# Patient Record
Sex: FEMALE | Race: WHITE | Employment: OTHER | URBAN - METROPOLITAN AREA
[De-identification: names, ages, dates, MRNs, and addresses within clinical notes are randomized per-mention and may not be internally consistent; named-entity substitution may affect disease eponyms.]

---

## 2022-06-01 ENCOUNTER — TRANSCRIBE ORDER (OUTPATIENT)
Dept: REGISTRATION | Age: 72
End: 2022-06-01

## 2022-06-01 ENCOUNTER — HOSPITAL ENCOUNTER (OUTPATIENT)
Dept: PREADMISSION TESTING | Age: 72
Discharge: HOME | End: 2022-06-01
Payer: MEDICARE

## 2022-06-01 DIAGNOSIS — Z01.812 BLOOD TESTS PRIOR TO TREATMENT OR PROCEDURE: ICD-10-CM

## 2022-06-01 DIAGNOSIS — M17.11 OSTEOARTHRITIS OF RIGHT KNEE: Primary | ICD-10-CM

## 2022-06-01 DIAGNOSIS — M17.11 OSTEOARTHRITIS OF RIGHT KNEE: ICD-10-CM

## 2022-06-01 LAB
ALBUMIN SERPL-MCNC: 3.5 G/DL (ref 3.4–5)
ALBUMIN/GLOB SERPL: 1 {RATIO} (ref 0.8–1.7)
ALP SERPL-CCNC: 58 U/L (ref 45–117)
ALT SERPL-CCNC: 23 U/L (ref 13–56)
ANION GAP SERPL CALC-SCNC: 6 MMOL/L (ref 3–18)
APPEARANCE UR: CLEAR
APTT PPP: 29.1 SEC (ref 23–36.4)
AST SERPL-CCNC: 18 U/L (ref 10–38)
ATRIAL RATE: 87 BPM
BACTERIA URNS QL MICRO: ABNORMAL /HPF
BASOPHILS # BLD: 0.1 K/UL (ref 0–0.1)
BASOPHILS NFR BLD: 1 % (ref 0–2)
BILIRUB SERPL-MCNC: 0.3 MG/DL (ref 0.2–1)
BILIRUB UR QL: NEGATIVE
BUN SERPL-MCNC: 19 MG/DL (ref 7–18)
BUN/CREAT SERPL: 22 (ref 12–20)
CALCIUM SERPL-MCNC: 9.1 MG/DL (ref 8.5–10.1)
CALCULATED P AXIS, ECG09: 65 DEGREES
CALCULATED R AXIS, ECG10: 18 DEGREES
CALCULATED T AXIS, ECG11: 64 DEGREES
CAOX CRY URNS QL MICRO: ABNORMAL
CHLORIDE SERPL-SCNC: 108 MMOL/L (ref 100–111)
CO2 SERPL-SCNC: 30 MMOL/L (ref 21–32)
COLOR UR: YELLOW
CREAT SERPL-MCNC: 0.88 MG/DL (ref 0.6–1.3)
DIAGNOSIS, 93000: NORMAL
DIFFERENTIAL METHOD BLD: ABNORMAL
EOSINOPHIL # BLD: 0.3 K/UL (ref 0–0.4)
EOSINOPHIL NFR BLD: 3 % (ref 0–5)
EPITH CASTS URNS QL MICRO: ABNORMAL /LPF (ref 0–5)
ERYTHROCYTE [DISTWIDTH] IN BLOOD BY AUTOMATED COUNT: 14.5 % (ref 11.6–14.5)
ERYTHROCYTE [SEDIMENTATION RATE] IN BLOOD: 17 MM/HR (ref 0–30)
GLOBULIN SER CALC-MCNC: 3.5 G/DL (ref 2–4)
GLUCOSE SERPL-MCNC: 108 MG/DL (ref 74–99)
GLUCOSE UR STRIP.AUTO-MCNC: NEGATIVE MG/DL
HBA1C MFR BLD: 6.1 % (ref 4.2–5.6)
HCT VFR BLD AUTO: 35.6 % (ref 35–45)
HGB BLD-MCNC: 11.3 G/DL (ref 12–16)
HGB UR QL STRIP: NEGATIVE
IMM GRANULOCYTES # BLD AUTO: 0 K/UL (ref 0–0.04)
IMM GRANULOCYTES NFR BLD AUTO: 0 % (ref 0–0.5)
INR PPP: 0.9 (ref 0.8–1.2)
KETONES UR QL STRIP.AUTO: ABNORMAL MG/DL
LEUKOCYTE ESTERASE UR QL STRIP.AUTO: ABNORMAL
LYMPHOCYTES # BLD: 2.9 K/UL (ref 0.9–3.6)
LYMPHOCYTES NFR BLD: 33 % (ref 21–52)
MCH RBC QN AUTO: 28.5 PG (ref 24–34)
MCHC RBC AUTO-ENTMCNC: 31.7 G/DL (ref 31–37)
MCV RBC AUTO: 89.9 FL (ref 78–100)
MONOCYTES # BLD: 0.6 K/UL (ref 0.05–1.2)
MONOCYTES NFR BLD: 7 % (ref 3–10)
NEUTS SEG # BLD: 4.9 K/UL (ref 1.8–8)
NEUTS SEG NFR BLD: 56 % (ref 40–73)
NITRITE UR QL STRIP.AUTO: NEGATIVE
NRBC # BLD: 0 K/UL (ref 0–0.01)
NRBC BLD-RTO: 0 PER 100 WBC
P-R INTERVAL, ECG05: 146 MS
PH UR STRIP: 5.5 [PH] (ref 5–8)
PLATELET # BLD AUTO: 340 K/UL (ref 135–420)
PMV BLD AUTO: 10.4 FL (ref 9.2–11.8)
POTASSIUM SERPL-SCNC: 3.6 MMOL/L (ref 3.5–5.5)
PROT SERPL-MCNC: 7 G/DL (ref 6.4–8.2)
PROT UR STRIP-MCNC: NEGATIVE MG/DL
PROTHROMBIN TIME: 13 SEC (ref 11.5–15.2)
Q-T INTERVAL, ECG07: 384 MS
QRS DURATION, ECG06: 86 MS
QTC CALCULATION (BEZET), ECG08: 462 MS
RBC # BLD AUTO: 3.96 M/UL (ref 4.2–5.3)
RBC #/AREA URNS HPF: NEGATIVE /HPF (ref 0–5)
SODIUM SERPL-SCNC: 144 MMOL/L (ref 136–145)
SP GR UR REFRACTOMETRY: 1.02 (ref 1–1.03)
UROBILINOGEN UR QL STRIP.AUTO: 1 EU/DL (ref 0.2–1)
VENTRICULAR RATE, ECG03: 87 BPM
WBC # BLD AUTO: 8.8 K/UL (ref 4.6–13.2)
WBC URNS QL MICRO: ABNORMAL /HPF (ref 0–5)

## 2022-06-01 PROCEDURE — 36415 COLL VENOUS BLD VENIPUNCTURE: CPT

## 2022-06-01 PROCEDURE — 81001 URINALYSIS AUTO W/SCOPE: CPT

## 2022-06-01 PROCEDURE — 93005 ELECTROCARDIOGRAM TRACING: CPT

## 2022-06-01 PROCEDURE — 85610 PROTHROMBIN TIME: CPT

## 2022-06-01 PROCEDURE — 85025 COMPLETE CBC W/AUTO DIFF WBC: CPT

## 2022-06-01 PROCEDURE — 85652 RBC SED RATE AUTOMATED: CPT

## 2022-06-01 PROCEDURE — 83036 HEMOGLOBIN GLYCOSYLATED A1C: CPT

## 2022-06-01 PROCEDURE — 80053 COMPREHEN METABOLIC PANEL: CPT

## 2022-06-01 PROCEDURE — 85730 THROMBOPLASTIN TIME PARTIAL: CPT

## 2022-06-02 LAB
BACTERIA SPEC CULT: NORMAL
BACTERIA SPEC CULT: NORMAL
SERVICE CMNT-IMP: NORMAL

## 2022-06-06 ENCOUNTER — HOSPITAL ENCOUNTER (OUTPATIENT)
Dept: PREADMISSION TESTING | Age: 72
Discharge: HOME | End: 2022-06-06

## 2022-06-06 VITALS — BODY MASS INDEX: 35.44 KG/M2 | WEIGHT: 200 LBS | HEIGHT: 63 IN

## 2022-06-06 RX ORDER — HYDROCHLOROTHIAZIDE 12.5 MG/1
12.5 TABLET ORAL DAILY
COMMUNITY

## 2022-06-06 RX ORDER — CEFAZOLIN SODIUM/WATER 2 G/20 ML
2 SYRINGE (ML) INTRAVENOUS ONCE
Status: CANCELLED | OUTPATIENT
Start: 2022-06-27 | End: 2022-06-27

## 2022-06-06 RX ORDER — CHOLECALCIFEROL (VITAMIN D3) 50 MCG
CAPSULE ORAL DAILY
COMMUNITY
End: 2022-06-27

## 2022-06-06 RX ORDER — LISINOPRIL 10 MG/1
10 TABLET ORAL DAILY
COMMUNITY

## 2022-06-06 RX ORDER — SODIUM CHLORIDE, SODIUM LACTATE, POTASSIUM CHLORIDE, CALCIUM CHLORIDE 600; 310; 30; 20 MG/100ML; MG/100ML; MG/100ML; MG/100ML
125 INJECTION, SOLUTION INTRAVENOUS CONTINUOUS
Status: CANCELLED | OUTPATIENT
Start: 2022-06-27

## 2022-06-06 RX ORDER — EZETIMIBE 10 MG/1
10 TABLET ORAL DAILY
COMMUNITY

## 2022-06-06 RX ORDER — ESOMEPRAZOLE MAGNESIUM 20 MG/1
20 TABLET, DELAYED RELEASE ORAL DAILY
COMMUNITY

## 2022-06-06 NOTE — PERIOP NOTES
Patient advised to wear mask when entering any of the buildings. They will no longer need to be tested or quarantine prior to procedure. Patient does not meet criteria for special pop. No hx of sleep apnea or previous screening. Denies hx of MH. PCP is aware of surgery. CHG skin care kit given and process reviewed. Not participating in any research study or clinical trials. Patient instructed when coming in for surgery, do not use any lotions, creams or deodorant. Also to remove all jewelry, hairpins, make-up and nail polish. Instructed to wear something loose fitting and comfortable, easy to take off, easy to put on. Patient to bring copy of medical directive dos. Preoperative Nutrition Screen (MANJIT)   Patient's Age: 70 y.o. Patient's BMI: Estimated body mass index is 36 kg/m² as calculated from the following:    Height as of this encounter: 5' 2.5\" (1.588 m). Weight as of this encounter: 90.7 kg (200 lb). 1. Does the patient have a documented serum albumin less than 3.0 within the last 90 days? No = 0          2. Is patient's BMI less than 18.5 (or less than 20 if age over 72)? No = 0        3. Has the patient had an unplanned weight loss of 10% of body weight or more in the last 6 months? No = 0   4. Has the patient been eating less than 50% of their normal diet in the preceding week?  No = 0   MANJIT Score (number of Yes responses), 0-4 0       Electronically signed by Darrin Galeazzi, RN on 6/6/22 at 8:42 AM

## 2022-06-23 PROBLEM — M17.11 PRIMARY LOCALIZED OSTEOARTHRITIS OF RIGHT KNEE: Chronic | Status: ACTIVE | Noted: 2022-06-23

## 2022-06-23 RX ORDER — TRANEXAMIC ACID 650 1/1
1950 TABLET ORAL ONCE
Status: CANCELLED | OUTPATIENT
Start: 2022-06-23 | End: 2022-06-23

## 2022-06-23 RX ORDER — DEXAMETHASONE SODIUM PHOSPHATE 4 MG/ML
8 INJECTION, SOLUTION INTRA-ARTICULAR; INTRALESIONAL; INTRAMUSCULAR; INTRAVENOUS; SOFT TISSUE ONCE
Status: CANCELLED | OUTPATIENT
Start: 2022-06-23 | End: 2022-06-23

## 2022-06-23 RX ORDER — DOCUSATE SODIUM 100 MG/1
100 CAPSULE, LIQUID FILLED ORAL 2 TIMES DAILY
Status: CANCELLED | OUTPATIENT
Start: 2022-06-23

## 2022-06-23 RX ORDER — EZETIMIBE 10 MG/1
10 TABLET ORAL DAILY
Status: CANCELLED | OUTPATIENT
Start: 2022-06-23

## 2022-06-23 RX ORDER — CEFAZOLIN SODIUM/WATER 2 G/20 ML
2 SYRINGE (ML) INTRAVENOUS EVERY 8 HOURS
Status: CANCELLED | OUTPATIENT
Start: 2022-06-23 | End: 2022-06-24

## 2022-06-23 RX ORDER — ASPIRIN 325 MG
325 TABLET, DELAYED RELEASE (ENTERIC COATED) ORAL 2 TIMES DAILY
Status: CANCELLED | OUTPATIENT
Start: 2022-06-23

## 2022-06-23 RX ORDER — LANOLIN ALCOHOL/MO/W.PET/CERES
1 CREAM (GRAM) TOPICAL 3 TIMES DAILY
Status: CANCELLED | OUTPATIENT
Start: 2022-06-23

## 2022-06-23 NOTE — H&P
[id number] [facility]  History and Physical Exam    Patient: Tanya Fritz MRN: [id number]  SSN: [id number]    YOB: 1950  Age: 70 y.o. Sex: female      Subjective:      Chief Complaint: Right knee pain    History of Present Illness:  Patient complains of pain to the right knee and difficulty ambulating, which has progressively worsened over several months. X-rays showed osteoarthritis of the joint. The patient's pain has persisted and progressed despite conservative treatments and therapies. The patient has been previously treated with nsaids. The patient has at this time opted for surgical intervention. Past Medical History:   Diagnosis Date    Arthritis     GERD (gastroesophageal reflux disease)     High cholesterol     Hypertension     Nausea & vomiting     Primary localized osteoarthritis of right knee 6/23/2022     Past Surgical History:   Procedure Laterality Date    HX DILATION AND CURETTAGE  1970s    HX HEENT  1958    Nasal growth removed    HX HYSTERECTOMY  2016    HX SALPINGO-OOPHORECTOMY  2016     Social History     Occupational History    Not on file   Tobacco Use    Smoking status: Never Smoker    Smokeless tobacco: Never Used   Vaping Use    Vaping Use: Never used   Substance and Sexual Activity    Alcohol use: Yes     Comment: 1-2/year    Drug use: Never    Sexual activity: Not on file     Prior to Admission medications    Medication Sig Start Date End Date Taking? Authorizing Provider   lisinopriL (PRINIVIL, ZESTRIL) 10 mg tablet Take 10 mg by mouth daily. Provider, Historical   hydroCHLOROthiazide (HYDRODIURIL) 12.5 mg tablet Take 12.5 mg by mouth daily. Indications: high blood pressure    Provider, Historical   ezetimibe (ZETIA) 10 mg tablet Take 10 mg by mouth daily. Provider, Historical   TURMERIC PO Take  by mouth daily.     Provider, Historical   omega 3-dha-epa-fish oil (Fish OiL) 100-160-1,000 mg cap Take  by mouth daily.    Provider, Historical   esomeprazole magnesium (NexIUM 24HR) 20 mg TbEC Take  by mouth daily. Provider, Historical       Allergies: No Known Allergies     Review of Systems:  A comprehensive review of systems was negative except for that written in the History of Present Illness. Objective:       Physical Exam:  HEENT: Normocephalic, atraumatic  Lungs:  Clear to auscultation  Heart:   Regular rate and rhythm  Abdomen: Soft  Extremities:  Pain with range of motion of the right knee(s). Active extension decreased, active flexion decreased. Tenderness generalized. No deformity. No effusion. Positive crepitus. Antalgic gait. Assessment:      Arthritis of the right knee. Plan:       Proceed with scheduled RIGHT TOTAL KNEE ARTHROPLASTY. The various methods of treatment have been discussed with the patient and family. After consideration of risks, benefits, and other options for treatment, the patient has consented to surgical interventions. Questions were answered and preoperative teaching was done by Dr Jacqueline Camara.      Signed By: JF Solis     June 23, 2022

## 2022-06-27 ENCOUNTER — HOME HEALTH ADMISSION (OUTPATIENT)
Dept: HOME HEALTH SERVICES | Facility: HOME HEALTH | Age: 72
End: 2022-06-27
Payer: MEDICARE

## 2022-06-27 ENCOUNTER — ANESTHESIA (OUTPATIENT)
Dept: SURGERY | Age: 72
End: 2022-06-27
Payer: MEDICARE

## 2022-06-27 ENCOUNTER — ANESTHESIA EVENT (OUTPATIENT)
Dept: SURGERY | Age: 72
End: 2022-06-27
Payer: MEDICARE

## 2022-06-27 ENCOUNTER — APPOINTMENT (OUTPATIENT)
Dept: GENERAL RADIOLOGY | Age: 72
End: 2022-06-27
Attending: PHYSICIAN ASSISTANT
Payer: MEDICARE

## 2022-06-27 ENCOUNTER — HOSPITAL ENCOUNTER (OUTPATIENT)
Age: 72
Discharge: HOME HEALTH | End: 2022-06-27
Attending: ORTHOPAEDIC SURGERY | Admitting: ORTHOPAEDIC SURGERY
Payer: MEDICARE

## 2022-06-27 VITALS
BODY MASS INDEX: 35.73 KG/M2 | TEMPERATURE: 97 F | WEIGHT: 209.3 LBS | HEIGHT: 64 IN | OXYGEN SATURATION: 96 % | DIASTOLIC BLOOD PRESSURE: 73 MMHG | SYSTOLIC BLOOD PRESSURE: 156 MMHG | HEART RATE: 72 BPM | RESPIRATION RATE: 18 BRPM

## 2022-06-27 DIAGNOSIS — M17.11 PRIMARY LOCALIZED OSTEOARTHRITIS OF RIGHT KNEE: Primary | Chronic | ICD-10-CM

## 2022-06-27 LAB
ABO + RH BLD: NORMAL
BLOOD GROUP ANTIBODIES SERPL: NORMAL
GLUCOSE BLD STRIP.AUTO-MCNC: 100 MG/DL (ref 70–110)
SPECIMEN EXP DATE BLD: NORMAL

## 2022-06-27 PROCEDURE — 82962 GLUCOSE BLOOD TEST: CPT

## 2022-06-27 PROCEDURE — 77030039760: Performed by: ORTHOPAEDIC SURGERY

## 2022-06-27 PROCEDURE — 77030020782 HC GWN BAIR PAWS FLX 3M -B: Performed by: ORTHOPAEDIC SURGERY

## 2022-06-27 PROCEDURE — 76210000006 HC OR PH I REC 0.5 TO 1 HR: Performed by: ORTHOPAEDIC SURGERY

## 2022-06-27 PROCEDURE — 77030012893: Performed by: ORTHOPAEDIC SURGERY

## 2022-06-27 PROCEDURE — 76010000153 HC OR TIME 1.5 TO 2 HR: Performed by: ORTHOPAEDIC SURGERY

## 2022-06-27 PROCEDURE — 77030003666 HC NDL SPINAL BD -A: Performed by: ORTHOPAEDIC SURGERY

## 2022-06-27 PROCEDURE — 76060000034 HC ANESTHESIA 1.5 TO 2 HR: Performed by: ORTHOPAEDIC SURGERY

## 2022-06-27 PROCEDURE — 77030034694 HC SCPL CANADY PLSM DISP USMD -E: Performed by: ORTHOPAEDIC SURGERY

## 2022-06-27 PROCEDURE — 36415 COLL VENOUS BLD VENIPUNCTURE: CPT

## 2022-06-27 PROCEDURE — 77030027138 HC INCENT SPIROMETER -A: Performed by: ORTHOPAEDIC SURGERY

## 2022-06-27 PROCEDURE — 74011250636 HC RX REV CODE- 250/636: Performed by: STUDENT IN AN ORGANIZED HEALTH CARE EDUCATION/TRAINING PROGRAM

## 2022-06-27 PROCEDURE — 74011000250 HC RX REV CODE- 250: Performed by: PHYSICIAN ASSISTANT

## 2022-06-27 PROCEDURE — 74011000258 HC RX REV CODE- 258: Performed by: ORTHOPAEDIC SURGERY

## 2022-06-27 PROCEDURE — 77030038010: Performed by: ORTHOPAEDIC SURGERY

## 2022-06-27 PROCEDURE — 86900 BLOOD TYPING SEROLOGIC ABO: CPT

## 2022-06-27 PROCEDURE — 97116 GAIT TRAINING THERAPY: CPT

## 2022-06-27 PROCEDURE — 74011250637 HC RX REV CODE- 250/637: Performed by: PHYSICIAN ASSISTANT

## 2022-06-27 PROCEDURE — 77030031139 HC SUT VCRL2 J&J -A: Performed by: ORTHOPAEDIC SURGERY

## 2022-06-27 PROCEDURE — 76210000024 HC REC RM PH II 2.5 TO 3 HR: Performed by: ORTHOPAEDIC SURGERY

## 2022-06-27 PROCEDURE — 97165 OT EVAL LOW COMPLEX 30 MIN: CPT

## 2022-06-27 PROCEDURE — 77030013708 HC HNDPC SUC IRR PULS STRY –B: Performed by: ORTHOPAEDIC SURGERY

## 2022-06-27 PROCEDURE — 2709999900 HC NON-CHARGEABLE SUPPLY: Performed by: ORTHOPAEDIC SURGERY

## 2022-06-27 PROCEDURE — 77030037713 HC CLOSR DEV INCIS ZIP STRY -B: Performed by: ORTHOPAEDIC SURGERY

## 2022-06-27 PROCEDURE — 97161 PT EVAL LOW COMPLEX 20 MIN: CPT

## 2022-06-27 PROCEDURE — 74011000250 HC RX REV CODE- 250: Performed by: ORTHOPAEDIC SURGERY

## 2022-06-27 PROCEDURE — C1776 JOINT DEVICE (IMPLANTABLE): HCPCS | Performed by: ORTHOPAEDIC SURGERY

## 2022-06-27 PROCEDURE — 77030014144 HC TY SPN ANES BBMI -B: Performed by: STUDENT IN AN ORGANIZED HEALTH CARE EDUCATION/TRAINING PROGRAM

## 2022-06-27 PROCEDURE — 74011000250 HC RX REV CODE- 250: Performed by: STUDENT IN AN ORGANIZED HEALTH CARE EDUCATION/TRAINING PROGRAM

## 2022-06-27 PROCEDURE — 97535 SELF CARE MNGMENT TRAINING: CPT

## 2022-06-27 PROCEDURE — 77030011628: Performed by: ORTHOPAEDIC SURGERY

## 2022-06-27 PROCEDURE — 77030002934 HC SUT MCRYL J&J -B: Performed by: ORTHOPAEDIC SURGERY

## 2022-06-27 PROCEDURE — 77030033263 HC DRSG MEPILEX 16-48IN BORD MOLN -B: Performed by: ORTHOPAEDIC SURGERY

## 2022-06-27 PROCEDURE — 74011250636 HC RX REV CODE- 250/636: Performed by: PHYSICIAN ASSISTANT

## 2022-06-27 PROCEDURE — 73560 X-RAY EXAM OF KNEE 1 OR 2: CPT

## 2022-06-27 PROCEDURE — 74011250636 HC RX REV CODE- 250/636: Performed by: ORTHOPAEDIC SURGERY

## 2022-06-27 PROCEDURE — 76942 ECHO GUIDE FOR BIOPSY: CPT | Performed by: ORTHOPAEDIC SURGERY

## 2022-06-27 PROCEDURE — C1713 ANCHOR/SCREW BN/BN,TIS/BN: HCPCS | Performed by: ORTHOPAEDIC SURGERY

## 2022-06-27 PROCEDURE — 64447 NJX AA&/STRD FEMORAL NRV IMG: CPT | Performed by: ORTHOPAEDIC SURGERY

## 2022-06-27 DEVICE — KNEE K1 TOT HEMI STD CEM IMPL CAPPED K1 OD: Type: IMPLANTABLE DEVICE | Site: KNEE | Status: FUNCTIONAL

## 2022-06-27 DEVICE — RT SIZE 4 FEMORAL CR NONPOROUS
Type: IMPLANTABLE DEVICE | Site: KNEE | Status: FUNCTIONAL
Brand: BKS TRIMAX

## 2022-06-27 DEVICE — CEMENT BNE 40GM FULL DOSE PMMA W/O ANTIBIO HI VISC N RADPQ: Type: IMPLANTABLE DEVICE | Site: KNEE | Status: FUNCTIONAL

## 2022-06-27 DEVICE — SIZE 4 8MM TIBIAL INSERT CR
Type: IMPLANTABLE DEVICE | Site: KNEE | Status: FUNCTIONAL
Brand: BKS E-VITALIZE

## 2022-06-27 DEVICE — 32MM PATELLA, VITAMIN E
Type: IMPLANTABLE DEVICE | Site: KNEE | Status: FUNCTIONAL
Brand: BKS E-VITALIZE

## 2022-06-27 DEVICE — SIZE 4 TIBIAL TRAY NONPOROUS
Type: IMPLANTABLE DEVICE | Site: KNEE | Status: FUNCTIONAL
Brand: BALANCED KNEE SYSTEM

## 2022-06-27 RX ORDER — SODIUM CHLORIDE 9 MG/ML
300 INJECTION, SOLUTION INTRAVENOUS CONTINUOUS
Status: DISCONTINUED | OUTPATIENT
Start: 2022-06-27 | End: 2022-06-27 | Stop reason: HOSPADM

## 2022-06-27 RX ORDER — BISMUTH SUBSALICYLATE 262 MG
1 TABLET,CHEWABLE ORAL DAILY
COMMUNITY

## 2022-06-27 RX ORDER — FENTANYL CITRATE 50 UG/ML
INJECTION, SOLUTION INTRAMUSCULAR; INTRAVENOUS
Status: SHIPPED | OUTPATIENT
Start: 2022-06-27 | End: 2022-06-27

## 2022-06-27 RX ORDER — ACETAMINOPHEN 325 MG/1
650 TABLET ORAL EVERY 6 HOURS
Status: DISCONTINUED | OUTPATIENT
Start: 2022-06-27 | End: 2022-06-27 | Stop reason: HOSPADM

## 2022-06-27 RX ORDER — PROPOFOL 10 MG/ML
INJECTION, EMULSION INTRAVENOUS AS NEEDED
Status: DISCONTINUED | OUTPATIENT
Start: 2022-06-27 | End: 2022-06-27 | Stop reason: HOSPADM

## 2022-06-27 RX ORDER — CEFAZOLIN SODIUM/WATER 2 G/20 ML
2 SYRINGE (ML) INTRAVENOUS ONCE
Status: COMPLETED | OUTPATIENT
Start: 2022-06-27 | End: 2022-06-27

## 2022-06-27 RX ORDER — LIDOCAINE HYDROCHLORIDE 20 MG/ML
INJECTION, SOLUTION EPIDURAL; INFILTRATION; INTRACAUDAL; PERINEURAL AS NEEDED
Status: DISCONTINUED | OUTPATIENT
Start: 2022-06-27 | End: 2022-06-27 | Stop reason: HOSPADM

## 2022-06-27 RX ORDER — KETOROLAC TROMETHAMINE 15 MG/ML
INJECTION, SOLUTION INTRAMUSCULAR; INTRAVENOUS AS NEEDED
Status: DISCONTINUED | OUTPATIENT
Start: 2022-06-27 | End: 2022-06-27 | Stop reason: HOSPADM

## 2022-06-27 RX ORDER — FENTANYL CITRATE 50 UG/ML
50 INJECTION, SOLUTION INTRAMUSCULAR; INTRAVENOUS
Status: DISCONTINUED | OUTPATIENT
Start: 2022-06-27 | End: 2022-06-27 | Stop reason: HOSPADM

## 2022-06-27 RX ORDER — MIDAZOLAM HYDROCHLORIDE 1 MG/ML
INJECTION, SOLUTION INTRAMUSCULAR; INTRAVENOUS
Status: SHIPPED | OUTPATIENT
Start: 2022-06-27 | End: 2022-06-27

## 2022-06-27 RX ORDER — SODIUM CHLORIDE 0.9 % (FLUSH) 0.9 %
5-40 SYRINGE (ML) INJECTION EVERY 8 HOURS
Status: DISCONTINUED | OUTPATIENT
Start: 2022-06-27 | End: 2022-06-27 | Stop reason: HOSPADM

## 2022-06-27 RX ORDER — KETOROLAC TROMETHAMINE 30 MG/ML
15 INJECTION, SOLUTION INTRAMUSCULAR; INTRAVENOUS EVERY 6 HOURS
Status: DISCONTINUED | OUTPATIENT
Start: 2022-06-27 | End: 2022-06-27 | Stop reason: HOSPADM

## 2022-06-27 RX ORDER — SODIUM CHLORIDE 0.9 % (FLUSH) 0.9 %
5-40 SYRINGE (ML) INJECTION AS NEEDED
Status: DISCONTINUED | OUTPATIENT
Start: 2022-06-27 | End: 2022-06-27 | Stop reason: HOSPADM

## 2022-06-27 RX ORDER — OXYCODONE HYDROCHLORIDE 5 MG/1
5 TABLET ORAL
Status: DISCONTINUED | OUTPATIENT
Start: 2022-06-27 | End: 2022-06-27 | Stop reason: HOSPADM

## 2022-06-27 RX ORDER — LIDOCAINE HYDROCHLORIDE 10 MG/ML
INJECTION INFILTRATION; PERINEURAL AS NEEDED
Status: DISCONTINUED | OUTPATIENT
Start: 2022-06-27 | End: 2022-06-27 | Stop reason: HOSPADM

## 2022-06-27 RX ORDER — NALOXONE HYDROCHLORIDE 0.4 MG/ML
0.4 INJECTION, SOLUTION INTRAMUSCULAR; INTRAVENOUS; SUBCUTANEOUS AS NEEDED
Status: DISCONTINUED | OUTPATIENT
Start: 2022-06-27 | End: 2022-06-27 | Stop reason: HOSPADM

## 2022-06-27 RX ORDER — ONDANSETRON 2 MG/ML
4 INJECTION INTRAMUSCULAR; INTRAVENOUS ONCE
Status: COMPLETED | OUTPATIENT
Start: 2022-06-27 | End: 2022-06-27

## 2022-06-27 RX ORDER — SODIUM CHLORIDE, SODIUM LACTATE, POTASSIUM CHLORIDE, CALCIUM CHLORIDE 600; 310; 30; 20 MG/100ML; MG/100ML; MG/100ML; MG/100ML
125 INJECTION, SOLUTION INTRAVENOUS CONTINUOUS
Status: DISCONTINUED | OUTPATIENT
Start: 2022-06-27 | End: 2022-06-27 | Stop reason: HOSPADM

## 2022-06-27 RX ORDER — NALOXONE HYDROCHLORIDE 0.4 MG/ML
0.04 INJECTION, SOLUTION INTRAMUSCULAR; INTRAVENOUS; SUBCUTANEOUS
Status: DISCONTINUED | OUTPATIENT
Start: 2022-06-27 | End: 2022-06-27 | Stop reason: HOSPADM

## 2022-06-27 RX ORDER — MELOXICAM 7.5 MG/1
7.5 TABLET ORAL 2 TIMES DAILY
Qty: 28 TABLET | Refills: 0 | Status: SHIPPED | OUTPATIENT
Start: 2022-06-27 | End: 2022-07-11

## 2022-06-27 RX ORDER — PANTOPRAZOLE SODIUM 40 MG/1
40 TABLET, DELAYED RELEASE ORAL ONCE
Status: COMPLETED | OUTPATIENT
Start: 2022-06-27 | End: 2022-06-27

## 2022-06-27 RX ORDER — DEXAMETHASONE SODIUM PHOSPHATE 10 MG/ML
INJECTION INTRAMUSCULAR; INTRAVENOUS
Status: SHIPPED | OUTPATIENT
Start: 2022-06-27 | End: 2022-06-27

## 2022-06-27 RX ORDER — ONDANSETRON 2 MG/ML
4 INJECTION INTRAMUSCULAR; INTRAVENOUS
Status: DISCONTINUED | OUTPATIENT
Start: 2022-06-27 | End: 2022-06-27 | Stop reason: HOSPADM

## 2022-06-27 RX ORDER — ALBUTEROL SULFATE 0.83 MG/ML
2.5 SOLUTION RESPIRATORY (INHALATION)
Status: DISCONTINUED | OUTPATIENT
Start: 2022-06-27 | End: 2022-06-27 | Stop reason: HOSPADM

## 2022-06-27 RX ORDER — DIPHENHYDRAMINE HYDROCHLORIDE 50 MG/ML
12.5 INJECTION, SOLUTION INTRAMUSCULAR; INTRAVENOUS
Status: DISCONTINUED | OUTPATIENT
Start: 2022-06-27 | End: 2022-06-27 | Stop reason: HOSPADM

## 2022-06-27 RX ORDER — CEFADROXIL 500 MG/1
500 CAPSULE ORAL 2 TIMES DAILY
Qty: 10 CAPSULE | Refills: 0 | Status: SHIPPED | OUTPATIENT
Start: 2022-06-27 | End: 2022-07-02

## 2022-06-27 RX ORDER — SODIUM CHLORIDE 9 MG/ML
125 INJECTION, SOLUTION INTRAVENOUS CONTINUOUS
Status: DISCONTINUED | OUTPATIENT
Start: 2022-06-27 | End: 2022-06-27 | Stop reason: HOSPADM

## 2022-06-27 RX ORDER — ACETAMINOPHEN 500 MG
1000 TABLET ORAL ONCE
Status: COMPLETED | OUTPATIENT
Start: 2022-06-27 | End: 2022-06-27

## 2022-06-27 RX ORDER — ROPIVACAINE HYDROCHLORIDE 5 MG/ML
INJECTION, SOLUTION EPIDURAL; INFILTRATION; PERINEURAL
Status: SHIPPED | OUTPATIENT
Start: 2022-06-27 | End: 2022-06-27

## 2022-06-27 RX ORDER — PROPOFOL 10 MG/ML
INJECTION, EMULSION INTRAVENOUS
Status: DISCONTINUED | OUTPATIENT
Start: 2022-06-27 | End: 2022-06-27 | Stop reason: HOSPADM

## 2022-06-27 RX ORDER — DEXMEDETOMIDINE HYDROCHLORIDE 100 UG/ML
INJECTION, SOLUTION INTRAVENOUS
Status: SHIPPED | OUTPATIENT
Start: 2022-06-27 | End: 2022-06-27

## 2022-06-27 RX ORDER — TRANEXAMIC ACID 10 MG/ML
1 INJECTION, SOLUTION INTRAVENOUS ONCE
Status: COMPLETED | OUTPATIENT
Start: 2022-06-27 | End: 2022-06-27

## 2022-06-27 RX ORDER — OXYCODONE HYDROCHLORIDE 5 MG/1
10 TABLET ORAL
Status: DISCONTINUED | OUTPATIENT
Start: 2022-06-27 | End: 2022-06-27 | Stop reason: HOSPADM

## 2022-06-27 RX ORDER — HYDROMORPHONE HYDROCHLORIDE 1 MG/ML
0.5 INJECTION, SOLUTION INTRAMUSCULAR; INTRAVENOUS; SUBCUTANEOUS AS NEEDED
Status: DISCONTINUED | OUTPATIENT
Start: 2022-06-27 | End: 2022-06-27 | Stop reason: HOSPADM

## 2022-06-27 RX ORDER — METOCLOPRAMIDE HYDROCHLORIDE 5 MG/ML
10 INJECTION INTRAMUSCULAR; INTRAVENOUS
Status: DISCONTINUED | OUTPATIENT
Start: 2022-06-27 | End: 2022-06-27 | Stop reason: HOSPADM

## 2022-06-27 RX ORDER — SODIUM CHLORIDE, SODIUM LACTATE, POTASSIUM CHLORIDE, CALCIUM CHLORIDE 600; 310; 30; 20 MG/100ML; MG/100ML; MG/100ML; MG/100ML
50 INJECTION, SOLUTION INTRAVENOUS CONTINUOUS
Status: DISCONTINUED | OUTPATIENT
Start: 2022-06-27 | End: 2022-06-27 | Stop reason: HOSPADM

## 2022-06-27 RX ORDER — ASPIRIN 325 MG
325 TABLET ORAL 2 TIMES DAILY
Qty: 42 TABLET | Refills: 0 | Status: SHIPPED | OUTPATIENT
Start: 2022-06-27 | End: 2022-07-18

## 2022-06-27 RX ORDER — OXYCODONE HYDROCHLORIDE 5 MG/1
5 TABLET ORAL
Qty: 60 TABLET | Refills: 0 | Status: SHIPPED | OUTPATIENT
Start: 2022-06-27 | End: 2022-07-04

## 2022-06-27 RX ORDER — ONDANSETRON 2 MG/ML
INJECTION INTRAMUSCULAR; INTRAVENOUS AS NEEDED
Status: DISCONTINUED | OUTPATIENT
Start: 2022-06-27 | End: 2022-06-27 | Stop reason: HOSPADM

## 2022-06-27 RX ORDER — NALBUPHINE HYDROCHLORIDE 10 MG/ML
5 INJECTION, SOLUTION INTRAMUSCULAR; INTRAVENOUS; SUBCUTANEOUS
Status: DISCONTINUED | OUTPATIENT
Start: 2022-06-27 | End: 2022-06-27 | Stop reason: HOSPADM

## 2022-06-27 RX ADMIN — SODIUM CHLORIDE, SODIUM LACTATE, POTASSIUM CHLORIDE, AND CALCIUM CHLORIDE: 600; 310; 30; 20 INJECTION, SOLUTION INTRAVENOUS at 09:36

## 2022-06-27 RX ADMIN — FENTANYL CITRATE 100 MCG: 50 INJECTION, SOLUTION INTRAMUSCULAR; INTRAVENOUS at 08:51

## 2022-06-27 RX ADMIN — DEXAMETHASONE SODIUM PHOSPHATE 4 MG: 10 INJECTION, SOLUTION INTRAMUSCULAR; INTRAVENOUS at 08:51

## 2022-06-27 RX ADMIN — ROPIVACAINE HYDROCHLORIDE 20 ML: 5 INJECTION, SOLUTION EPIDURAL; INFILTRATION; PERINEURAL at 08:51

## 2022-06-27 RX ADMIN — ONDANSETRON 4 MG: 2 INJECTION INTRAMUSCULAR; INTRAVENOUS at 11:32

## 2022-06-27 RX ADMIN — SODIUM CHLORIDE, SODIUM LACTATE, POTASSIUM CHLORIDE, AND CALCIUM CHLORIDE 1000 ML: 600; 310; 30; 20 INJECTION, SOLUTION INTRAVENOUS at 08:10

## 2022-06-27 RX ADMIN — PROPOFOL 20 MG: 10 INJECTION, EMULSION INTRAVENOUS at 09:41

## 2022-06-27 RX ADMIN — SODIUM CHLORIDE, SODIUM LACTATE, POTASSIUM CHLORIDE, AND CALCIUM CHLORIDE 125 ML/HR: 600; 310; 30; 20 INJECTION, SOLUTION INTRAVENOUS at 13:48

## 2022-06-27 RX ADMIN — Medication 2 G: at 09:39

## 2022-06-27 RX ADMIN — TRANEXAMIC ACID 1 G: 10 INJECTION, SOLUTION INTRAVENOUS at 09:37

## 2022-06-27 RX ADMIN — PROPOFOL 30 MCG/KG/MIN: 10 INJECTION, EMULSION INTRAVENOUS at 09:41

## 2022-06-27 RX ADMIN — ACETAMINOPHEN 1000 MG: 500 TABLET ORAL at 08:12

## 2022-06-27 RX ADMIN — LIDOCAINE HYDROCHLORIDE 2.5 ML: 10 INJECTION, SOLUTION INFILTRATION; PERINEURAL at 09:31

## 2022-06-27 RX ADMIN — MIDAZOLAM 2 MG: 1 INJECTION INTRAMUSCULAR; INTRAVENOUS at 08:51

## 2022-06-27 RX ADMIN — LIDOCAINE HYDROCHLORIDE 60 MG: 20 INJECTION, SOLUTION INTRAVENOUS at 09:41

## 2022-06-27 RX ADMIN — KETOROLAC TROMETHAMINE 15 MG: 15 INJECTION, SOLUTION INTRAMUSCULAR; INTRAVENOUS at 10:29

## 2022-06-27 RX ADMIN — PANTOPRAZOLE SODIUM 40 MG: 40 TABLET, DELAYED RELEASE ORAL at 08:12

## 2022-06-27 RX ADMIN — DEXMEDETOMIDINE HYDROCHLORIDE 25 MCG: 100 INJECTION, SOLUTION INTRAVENOUS at 08:51

## 2022-06-27 RX ADMIN — SODIUM CHLORIDE, SODIUM LACTATE, POTASSIUM CHLORIDE, AND CALCIUM CHLORIDE: 600; 310; 30; 20 INJECTION, SOLUTION INTRAVENOUS at 10:41

## 2022-06-27 RX ADMIN — SODIUM CHLORIDE, SODIUM LACTATE, POTASSIUM CHLORIDE, AND CALCIUM CHLORIDE 125 ML/HR: 600; 310; 30; 20 INJECTION, SOLUTION INTRAVENOUS at 08:10

## 2022-06-27 RX ADMIN — ONDANSETRON HYDROCHLORIDE 4 MG: 2 INJECTION INTRAMUSCULAR; INTRAVENOUS at 10:29

## 2022-06-27 RX ADMIN — METOCLOPRAMIDE HYDROCHLORIDE 10 MG: 5 INJECTION INTRAMUSCULAR; INTRAVENOUS at 13:36

## 2022-06-27 RX ADMIN — MEPIVACAINE HYDROCHLORIDE 45 MG: 15 INJECTION, SOLUTION EPIDURAL; INFILTRATION at 09:34

## 2022-06-27 NOTE — ANESTHESIA PROCEDURE NOTES
Peripheral Block    Start time: 6/27/2022 8:47 AM  End time: 6/27/2022 8:51 AM  Performed by: Cony Self MD  Authorized by: Cony Self MD       Pre-procedure: Indications: at surgeon's request and post-op pain management    Preanesthetic Checklist: patient identified, risks and benefits discussed, site marked, timeout performed, anesthesia consent given and patient being monitored    Timeout Time: 08:47 EDT          Block Type:   Block Type: Adductor canal block  Laterality:  Right  Monitoring:  Standard ASA monitoring, responsive to questions, oxygen, continuous pulse ox, frequent vital sign checks and heart rate  Injection Technique:  Single shot  Procedures: ultrasound guided    Patient Position: supine  Prep: chlorhexidine    Location:  Mid thigh  Needle Type:  Stimuplex  Needle Gauge:  20 G  Needle Localization:  Ultrasound guidance  Medication Injected:  FentaNYL citrate (PF) injection sedation initial, 100 mcg  midazolam (VERSED) injection, 2 mg  ropivacaine (PF) (NAROPIN) 5 mg/mL (0.5 %) injection, 20 mL  dexamethasone (PF) (DECADRON) 10 mg/mL injection, 4 mg  dexmedeTOMidine (PRECEDEX) 100 mcg/mL iv solution, 25 mcg  Med Admin Time: 6/27/2022 8:51 AM    Assessment:  Number of attempts:  1  Injection Assessment:  Incremental injection every 5 mL, negative aspiration for CSF, no paresthesia, ultrasound image on chart, local visualized surrounding nerve on ultrasound, negative aspiration for blood and no intravascular symptoms  Patient tolerance:  Patient tolerated the procedure well with no immediate complications  Ultrasound guidance was used to view and verify medication disbursement and was also used for needle placement.

## 2022-06-27 NOTE — DISCHARGE SUMMARY
[address] [facility] [id number]     DISCHARGE SUMMARY     PATIENT: Emery Koyanagi     MRN: [id number]   ADMIT DATE: 2022   [facility]   DISCHARGE DATE: 2022     ATTENDING: Kaleb Devi MD   DICTATING: JF Courtney         ADMISSION DIAGNOSIS: Primary localized osteoarthritis of right knee [M17.11]    DISCHARGE DIAGNOSIS: Status post RIGHT TOTAL KNEE ARTHROPLASTY    HISTORY OF PRESENT ILLNESS: The patient is a 70y.o. year-old female   with ongoing right knee pain secondary to osteoarthritis of her right knee. The patient's pain has persisted and progressed despite conservative treatments and therapies. The patient has at this time opted for surgical intervention. PAST MEDICAL HISTORY:   Past Medical History:   Diagnosis Date    Arthritis     GERD (gastroesophageal reflux disease)     High cholesterol     Hypertension     Nausea & vomiting     Primary localized osteoarthritis of right knee 2022       PAST SURGICAL HISTORY:   Past Surgical History:   Procedure Laterality Date    HX DILATION AND CURETTAGE      HX HEENT      Nasal growth removed    HX HYSTERECTOMY      HX SALPINGO-OOPHORECTOMY         ALLERGIES: No Known Allergies     CURRENT MEDICATIONS:  A list of medications prior to the time of admission include:  Prior to Admission medications    Medication Sig Start Date End Date Taking? Authorizing Provider   aspirin (ASPIRIN) 325 mg tablet Take 1 Tablet by mouth two (2) times a day for 21 days. 22 Yes Elliot Patterson PA   cefadroxil (DURICEF) 500 mg capsule Take 1 Capsule by mouth two (2) times a day for 5 days. 22 Yes Elliot Patterson PA   meloxicam (MOBIC) 7.5 mg tablet Take 1 Tablet by mouth two (2) times a day for 14 days.  22 Yes Elliot Patterson PA   oxyCODONE IR (ROXICODONE) 5 mg immediate release tablet Take 1 Tablet by mouth every four (4) hours as needed for Pain for up to 7 days. Max Daily Amount: 30 mg. 6/27/22 7/4/22 Yes Elliot Patterson PA   multivitamin (ONE A DAY) tablet Take 1 Tablet by mouth daily. Yes Provider, Historical   lisinopriL (PRINIVIL, ZESTRIL) 10 mg tablet Take 10 mg by mouth daily. Yes Provider, Historical   hydroCHLOROthiazide (HYDRODIURIL) 12.5 mg tablet Take 12.5 mg by mouth daily. Indications: high blood pressure   Yes Provider, Historical   ezetimibe (ZETIA) 10 mg tablet Take 10 mg by mouth daily. Yes Provider, Historical   esomeprazole magnesium (NexIUM 24HR) 20 mg TbEC Take  by mouth daily. Yes Provider, Historical       FAMILY HISTORY: History reviewed. No pertinent family history. SOCIAL HISTORY:   Social History     Socioeconomic History    Marital status:    Tobacco Use    Smoking status: Never Smoker    Smokeless tobacco: Never Used   Vaping Use    Vaping Use: Never used   Substance and Sexual Activity    Alcohol use: Yes     Comment: 1-2/year    Drug use: Never       REVIEW OF SYSTEMS: All review of systems are negative. PHYSICAL EXAMINATION: For a detailed physical exam, please refer to the patient's chart. HOSPITAL COURSE: The patient was taken to surgery the day of admission. she underwent a right total knee replacement. Operative course was benign. Estimated blood loss was approximately 150 cc. The patient was taken to the PACU in stable condition and was later taken to the floor in stable condition. During her hospital stay, the patient progressed well with physical therapy and occupational therapy, adherent to instructions. she had been cleared by physical therapy with stair training. she was placed on Aspirin for DVT prophylaxis. her pain has been well controlled with oral pain medications. her vitals have remained stable. she has also remained hemodynamically stable. The patient has been recommended for discharge home.      DISCHARGE INSTRUCTIONS: The patient is to be discharged home. she is to continue on her prior medications per the medication reconciliation form, to which we will add:   1. Aspirin 325 mg; 1 tablet po bid x 21 days  2. Mobic 7.5 mg; 1 tablet po bid x 14 days  3. Roxicodone 5 mg; 1 tablets p.o. every 4 hours p.r.n. for pain  4. Duricef 500 mg; 1 tablet p.o. every 12 hours x 5 days    The patient is to continue at home with home physical therapy 3 times a week to work on gait training, range of motion, strengthening, and weightbearing exercises as tolerated on her right lower extremity. The patient is to progress from a walker to a cane to complete total weightbearing as tolerable. The patient is to continue to keep her incision dry. The patient is to followup with Dr. Norma aBck, Onel Duron PA-C and/or Kaya Lepe PA-C in the office approximately 10-14 days status post for x-rays and further evaluation.     Berenice Dior 1723, [place]  8/09/17259:03 PM

## 2022-06-27 NOTE — PROGRESS NOTES
Problem: Mobility Impaired (Adult and Pediatric)  Goal: *Acute Goals and Plan of Care (Insert Text)  Description: PT goals to be met in 1 day:  Pt will be able to perform supine<>sit SBA for transfers at home. Pt will be able to perform sit<>stand SBA for increased ability to transfer at home safely. Pt will be able to participate in gt training >100' w/ RW, WBAT, GB and CGA/SBA for improved ability in home upon d/c. Pt will be educated regarding HEP per MD protocol for optimal AROM/strength outcomes. Note: [x]  Patient has met MD mobilization critieria for d/c home   [x]  Recommend HH with 24 hour adult care   []  Benefit from additional acute PT session to address:      PHYSICAL THERAPY EVALUATION    Patient: Saul Kraft (69 y.o. female)  Date: 6/27/2022  Primary Diagnosis: Primary localized osteoarthritis of right knee [M17.11]  Procedure(s) (LRB):  RIGHT TOTAL KNEE REPLACEMENT (Right) Day of Surgery   Precautions:   Fall,WBAT    PLOF: Independent    ASSESSMENT :  Based on the objective data described below, the patient presents with decreased mobility in regards to bed mobility, transfers, gt quality and tolerance, balance, stair negotiation and safety due to R TKA surgery. Decreased AROM of R knee, dec strength of R knee, pain in R knee, dec sensation of R knee also impacting pt functional mobility. Pt rating pain on numerical pain scale pre/post and during session 5/10. Pt ed regarding mobility safety, WB, HEP, ice application/use, elevation, environmental safety and home safe techniques. Pt sitting in recliner upon arrival w/ c/o nausea. Pt able to perform sit<>stand w/ CGA/SBA. Safety vc required throughout session to reinforce safety. Pt able to participate in gt training using RW, GB, WBAT and CGA w/ antalgic gt pattern. Pt requests stair training despite having stair lift for home. Pt was able to participate in stair training using step to pattern, B rails and CGA.   Answered questions by pt in regards to PT and mobility. Pt left sitting in recliner w/ all needs within reach and ice pack to R knee. Nurse Lilibeth Law aware of session and outcomes. Recommend HHPT with responsible adult care at least 24 hours upon hospital d/c. Patient will benefit from skilled intervention to address the above impairments. Patient's rehabilitation potential is considered to be Good  Factors which may influence rehabilitation potential include:   []         None noted  []         Mental ability/status  []         Medical condition  []         Home/family situation and support systems  []         Safety awareness  [x]         Pain tolerance/management  []         Other:      PLAN :  Recommendations and Planned Interventions:   [x]           Bed Mobility Training             []    Neuromuscular Re-Education  [x]           Transfer Training                   []    Orthotic/Prosthetic Training  [x]           Gait Training                          [x]    Modalities  [x]           Therapeutic Exercises           [x]    Edema Management/Control  [x]           Therapeutic Activities            [x]    Family Training/Education  [x]           Patient Education  []           Other (comment):    Frequency/Duration: Patient will be followed by physical therapy 1-2 times per day/4-7 days per week to address goals. Discharge Recommendations: Home Health  Further Equipment Recommendations for Discharge: N/A     SUBJECTIVE:   Patient stated My thigh feels so stiff and sore.     OBJECTIVE DATA SUMMARY:     Past Medical History:   Diagnosis Date    Arthritis     GERD (gastroesophageal reflux disease)     High cholesterol     Hypertension     Nausea & vomiting     Primary localized osteoarthritis of right knee 6/23/2022     Past Surgical History:   Procedure Laterality Date    HX DILATION AND CURETTAGE  1970s    HX HEENT  1958    Nasal growth removed    HX HYSTERECTOMY  2016    HX SALPINGO-OOPHORECTOMY  2016     Barriers to Learning/Limitations: yes;  anesthesia  Compensate with: Visual Cues, Verbal Cues, and Tactile Cues  Home Situation:  Home Situation  Home Environment: Private residence ([facility])  # Steps to Enter: 5  Rails to Enter: Yes  Hand Rails : Left  One/Two Story Residence: One story  Living Alone: No  Support Systems: Spouse/Significant Other  Patient Expects to be Discharged to[de-identified] Home with home health  Current DME Used/Available at Home: Ronnie Newness, rolling,Raised toilet seat,Cane, straight  Tub or Shower Type: Shower (w/ bench)  Critical Behavior:  Neurologic State: Alert; Appropriate for age  Orientation Level: Oriented to person;Oriented to place;Oriented to situation  Cognition: Follows commands  Safety/Judgement: Awareness of environment  Psychosocial  Patient Behaviors: Calm; Cooperative  Skin Condition/Temp: Dry;Warm  Skin Integrity: Incision (comment) (R knee)  Skin Integumentary  Skin Color: Appropriate for ethnicity  Skin Condition/Temp: Dry;Warm  Skin Integrity: Incision (comment) (R knee)  Strength:    Strength: Generally decreased, functional  Tone & Sensation:   Tone: Normal  Sensation: Impaired (R knee)  Range Of Motion:  AROM: Generally decreased, functional  Posture:  Functional Mobility:  Bed Mobility:  Scooting: Stand-by assistance  Transfers:  Sit to Stand: Contact guard assistance (vc)  Stand to Sit: Contact guard assistance;Stand-by assistance (vc)  Balance:   Sitting: Intact  Standing: Intact; With support  Ambulation/Gait Training:  Distance (ft): 130 Feet (ft)  Assistive Device: Walker, rolling;Gait belt  Ambulation - Level of Assistance: Contact guard assistance (vc)  Gait Abnormalities: Antalgic;Decreased step clearance; Step to gait  Right Side Weight Bearing: As tolerated  Base of Support: Shift to left  Stance: Right decreased  Speed/Latha: Slow  Step Length: Left shortened;Right shortened  Swing Pattern: Left asymmetrical;Right asymmetrical  Interventions: Safety awareness training; Tactile cues;Verbal cues; Visual/Demos  Stairs:  Number of Stairs Trained: 5  Stairs - Level of Assistance: Contact guard assistance (vc)  Rail Use: Both     Therapeutic Exercises:   Encouraged HEP  Pain:  Pain level pre-treatment: 5/10   Pain level post-treatment: 5/10   Pain Intervention(s) : Medication (see MAR); Rest, Ice, Repositioning  Response to intervention: Nurse notified, See doc flow    Activity Tolerance:   Fair   Please refer to the flowsheet for vital signs taken during this treatment. After treatment:   [x]         Patient left in no apparent distress sitting up in chair  []         Patient left in no apparent distress in bed  [x]         Call bell left within reach  [x]         Nursing notified  [x]         Caregiver present  []         Bed alarm activated  []         SCDs applied    COMMUNICATION/EDUCATION:   [x]         Role of Physical Therapy in the acute care setting. [x]         Fall prevention education was provided and the patient/caregiver indicated understanding. [x]         Patient/family have participated as able in goal setting and plan of care. [x]         Patient/family agree to work toward stated goals and plan of care. []         Patient understands intent and goals of therapy, but is neutral about his/her participation. []         Patient is unable to participate in goal setting/plan of care: ongoing with therapy staff.  []         Other:     Thank you for this referral.  Rajesh Kellogg, PT   Time Calculation: 26 mins      Eval Complexity: History: HIGH Complexity :3+ comorbidities / personal factors will impact the outcome/ POC Exam:MEDIUM Complexity : 3 Standardized tests and measures addressing body structure, function, activity limitation and / or participation in recreation  Presentation: LOW Complexity : Stable, uncomplicated  Clinical Decision Making:Low Complexity    Overall Complexity:LOW

## 2022-06-27 NOTE — INTERVAL H&P NOTE
Update History & Physical    The Patient's History and Physical of June 23, 2022 was reviewed with the patient and I examined the patient. There was no change. The surgical site was confirmed by the patient and me. Plan:  The risk, benefits, expected outcome, and alternative to the recommended procedure have been discussed with the patient. Patient understands and wants to proceed with the procedure.     Electronically signed by Damon Varner MD on 6/27/2022 at 8:01 AM

## 2022-06-27 NOTE — PROGRESS NOTES
Problem: Self Care Deficits Care Plan (Adult)  Goal: *Acute Goals and Plan of Care (Insert Text)  Description: Initial Occupational Therapy Goals (6/27/2022) Within 7 day(s):    1. Patient will perform grooming standing sinkside with supervision for increased independence with ADLs. 2. Patient will perform LB dressing with supervision & A/E PRN for increased independence with ADLs. 3. Patient will perform toilet transfer with supervision for increased independence with ADLs. 4. Patient will perform all aspects of toileting with supervision for increased independence with ADLs. 5. Patient will independently apply energy conservation techniques with 1 verbal cue(s)for increased independence with ADLs. 6. Patient will perform bathroom mobility with supervision for increased independence/safety with ADLs. Outcome: Progressing Towards Goal  OCCUPATIONAL THERAPY EVALUATION    Patient: Tomasa Narvaez (22 y.o. female)  Date: 6/27/2022  Primary Diagnosis: Primary localized osteoarthritis of right knee [M17.11]  Procedure(s) (LRB):  RIGHT TOTAL KNEE REPLACEMENT (Right) Day of Surgery   Precautions: Fall,WBAT  PLOF: pt mod I for ADLs/functional mobility    ASSESSMENT AND RECOMMENDATIONS:  Based on the objective data described below, the patient presents with RLE decreased ROM and strength affecting LE ADLs. Pt found seated in recliner chair, vitals assessed and WNL, pt reporting pain 5/10, agreeable to therapy. Educated pt on proper body mechanics for ADLs s/p TKR. Pt completed upper body dressing with supervision seated in chair. Pt able to thread B feet through underwear/shorts without assist, and CGA when standing to pull up to waist. Bending forward method utilized to perform sock/shoe donning, min A to complete task. Instructed on compensatory strategies for donning/doffing compression stockings, along with fall prevention strategies (wear proper fitting footwear with non-slip bottoms).  Pt CGA/SBA for STS/bathroom mobility/toilet transfer with vc for safe use of RW. Pt voided and performed bladder hygiene with SBA. Pt ambulated back to recliner, ice applied to R knee. Provided opportunity for pt to voice questions on ADL performance when home, pt has no further concerns. Patient will benefit from skilled Occupational Therapy intervention to maximize safety/independence with ADLs at d/c.    Education: Reviewed home safety, body mechanics, importance of moving every hour to prevent joint stiffness, role of ice for edema/pain control, Rolling Walker management/safety, and adaptive dressing techniques with patient verbalizing  understanding at this time     Patient will benefit from skilled intervention to address the above impairments. Patient's rehabilitation potential is considered to be Good  Factors which may influence rehabilitation potential include:   [x]             None noted  []             Mental ability/status  []             Medical condition  []             Home/family situation and support systems  []             Safety awareness  []             Pain tolerance/management  []             Other:        PLAN :  Recommendations and Planned Interventions:   [x]               Self Care Training                  [x]      Therapeutic Activities  [x]               Functional Mobility Training   []      Cognitive Retraining  []               Therapeutic Exercises           []      Endurance Activities  []               Balance Training                    []      Neuromuscular Re-Education  []               Visual/Perceptual Training     [x]      Home Safety Training  [x]               Patient Education                   [x]      Family Training/Education  []               Other (comment):    Frequency/Duration: Patient will be followed by Occupational Therapy 1-2 times per day/4-7 days per week to address goals.   Discharge Recommendations: Home health with adult supervision at least 24 hours after d/c  Further Equipment Recommendations for Discharge: N/A     SUBJECTIVE:   Patient stated I was really nauseous but I'm feeling better.     OBJECTIVE DATA SUMMARY:     Past Medical History:   Diagnosis Date    Arthritis     GERD (gastroesophageal reflux disease)     High cholesterol     Hypertension     Nausea & vomiting     Primary localized osteoarthritis of right knee 6/23/2022     Past Surgical History:   Procedure Laterality Date    HX DILATION AND CURETTAGE  1970s    HX HEENT  1958    Nasal growth removed    HX HYSTERECTOMY  2016    HX SALPINGO-OOPHORECTOMY  2016     Barriers to Learning/Limitations: yes;  physical, post-anesthesia  Compensate with: visual, verbal, tactile, kinesthetic cues/model    Home Situation/Prior Level of Function:   Home Situation  Home Environment: Private residence  # Steps to Enter: 5  Rails to Enter: Yes  Hand Rails : Left  One/Two Story Residence: One story  Living Alone: No  Support Systems: Spouse/Significant Other  Patient Expects to be Discharged to[de-identified] Home with home health  Current DME Used/Available at Home: Jeannette Mena, rolling,Raised toilet seat,Cane, straight  Tub or Shower Type: Shower (bench)  []  Right hand dominant   []  Left hand dominant    Cognitive/Behavioral Status:  Neurologic State: Alert; Appropriate for age  Orientation Level: Oriented to person;Oriented to place;Oriented to situation  Cognition: Follows commands  Safety/Judgement: Awareness of environment    Skin: R knee incision w/ Mepilex   Edema: compression hose in place & applied ice     Coordination: BUE  Coordination: Within functional limits  Fine Motor Skills-Upper: Left Intact; Right Intact    Gross Motor Skills-Upper: Left Intact; Right Intact    Balance:  Sitting: Intact  Standing: Intact; With support    Strength: BUE  Strength: Generally decreased, functional    Tone & Sensation:BUE  Tone: Normal  Sensation: Impaired (R knee)    Range of Motion: BUE  AROM: Generally decreased, functional    Functional Mobility and Transfers for ADLs:  Bed Mobility:  Scooting: Stand-by assistance  Transfers:  Sit to Stand: Contact guard assistance (vc)   Toilet Transfer : Contact guard assistance;Stand-by assistance   Bathroom Mobility: Contact guard assistance;Stand-by assistance    ADL Assessment:  Feeding: Independent  Oral Facial Hygiene/Grooming: Stand-by assistance  Bathing: Minimum assistance  Upper Body Dressing: Supervision  Lower Body Dressing: Contact guard assistance  Toileting: Stand by assistance    ADL Intervention:  Grooming  Grooming Assistance: Contact guard assistance  Position Performed: Standing  Washing Hands: Contact guard assistance    Upper Body Dressing Assistance  Dressing Assistance: Supervision  Pullover Shirt: Supervision    Lower Body Dressing Assistance  Dressing Assistance: Contact guard assistance  Underpants: Contact guard assistance  Socks: Minimum assistance  Slip on Shoes with Back: Minimum assistance  Leg Crossed Method Used: No  Position Performed: Seated in chair  Cues: Verbal cues provided;Visual cues provided    Toileting  Toileting Assistance: Contact guard assistance  Bladder Hygiene: Stand-by assistance  Clothing Management: Contact guard assistance    Cognitive Retraining  Safety/Judgement: Awareness of environment    Pain:  Pain level pre-treatment: 5/10  Pain level post-treatment: 5/10  Pain Intervention(s): Rest, Ice, Repositioning   Response to intervention: Nurse notified, see doc flow     Activity Tolerance:   Fair. Patient able to stand ~5 minute(s). Patient able to complete ADLs with intermittent rest breaks. Patient limited by pain, strength, ROM. Patient unsteady. Please refer to the flowsheet for vital signs taken during this treatment.   After treatment:   [x]  Patient left in no apparent distress sitting up in chair  []  Patient sitting on EOB  []  Patient left in no apparent distress in bed  [x]  Call bell left within reach  [x]  Nursing notified  [x]  Caregiver present  [x]  Ice applied  []  SCD's on while back in bed  [] Bed alarm activated    COMMUNICATION/EDUCATION:   Communication/Collaboration:  [x]       Role of Occupational Therapy in the acute care setting. [x]      Home safety education was provided and the patient/caregiver indicated understanding. [x]      Patient/family have participated as able in goal setting and plan of care. [x]      Patient/family agree to work toward stated goals and plan of care. []      Patient understands intent and goals of therapy, but is neutral about his/her participation. []      Patient is unable to participate in plan of care at this time. Thank you for this referral.  Paulo Crum, OTR/L  Time Calculation: 24 mins    Eval Complexity: History: MEDIUM Complexity : Expanded review of history including physical, cognitive and psychosocial  history ; Examination: LOW Complexity : 1-3 performance deficits relating to physical, cognitive , or psychosocial skils that result in activity limitations and / or participation restrictions ;    Decision Making:LOW Complexity : No comorbidities that affect functional and no verbal or physical assistance needed to complete eval tasks

## 2022-06-27 NOTE — OP NOTES
[address] [facility] [address] [facility]  Total Knee Arthroplasty    Patient: Lauren Saunders MRN: [id number]  SSN: [id number]    YOB: 1950  Age: 70 y.o. Sex: female      Date of Surgery: 6/27/2022   Preoperative Diagnosis: RIGHT KNEE OSTEOARTHRITIS   Postoperative Diagnosis: RIGHT KNEE OSTEOARTHRITIS   [facility]: [facility]  Surgeon: Jj Chapman MD  [facility]    Anesthesia: Spinal and adductor canal Nerve Block    Procedure: Total Knee Arthroplasty:   The complexity of the total joint surgery requires the use of a first assistant for positioning, retraction and assistance in closure. Tourniquet Time: Tourniquet not used. Estimated Blood Loss: Less than 100cc     Implants:   Implant Name Type Inv. Item Serial No.  Lot No. LRB No. Used Action   CEMENT BNE 40GM FULL DOSE PMMA W/O ANTIBIO HI VISC N RADPQ - [id number]  CEMENT BNE 40GM FULL DOSE PMMA W/O ANTIBIO HI VISC N RADPQ  [employer]WD [id number] Right 2 Implanted   INSERT TIB SZ 4 THK8MM CRUCE RET BKS E VITALIZE - [id number]  INSERT TIB SZ 4 THK8MM CRUCE RET BKS E VITALIZE  [employer]WD [id number] Right 1 Implanted   COMPONENT PAT H32MM STD E VITALIZE LEONELA FELICIA RND BAL SYS - [id number]  COMPONENT PAT H32MM STD E VITALIZE LEONELA FELICIA RND BAL SYS  [employer]WD [id number] Right 1 Implanted   TRAY TIB SZ 4 NP A BAL KNEE - [id number]  TRAY TIB SZ 4 NP A BAL KNEE  [employer]WD [id number] Right 1 Implanted   COMPONENT FEM SZ 4 R NP CRUCE RET BKS TRIMAX - [id number]  COMPONENT FEM SZ 4 R NP CRUCE RET BKS TRIMAX  [employer]WD [id number] Right 1 Implanted        Specimens: None    Additional Findings: None     Complications: none    Body Mass Index: Body mass index is 35.93 kg/m².     Procedure Detail:  Prior to the surgery the patient was administered a femoral nerve block in the preoperative holding area by the anesthesiologist. [place] was brought to the operating room and positioned on the operating table. She was anesthetized with anesthesia. Intravenous antibiotics were administered. Prior to the incision being made a timeout was called identifying the patient, procedure, operative side, and surgeon. A pneumatic tourniquet was placed about the limb and the right leg was prepped and draped in the usual sterile manner. The tourniquet was not inflated throughout the case. A midline anterior incision made over the knee. The incision was carried down through the subcutaneous tissue to the underlying capsule. A medial parapatellar capsular incision was performed. The medial capsular flap was carefully elevated around to the posterior medial corner protecting the medial collateral ligaments and the fibers. The patella was sized with a caliper, and approximately 10-12 mm was resected with an oscillating saw allowing the patella to be slid into the lateral gutter. It was not everted throughout the case. Our attention was first turned to the distal femur and using intermedullary instrumentation, a 5-degree valgus cut was on the distal end of the femur. The distal end of the femur was sized to a size 4 femoral component. Pins were inserted through the sizer and the corresponding 4-in-1 block was slid into place and pinned for stability. Anterior and posterior and chamfer cuts were made to accommodate the femoral component. The medial and lateral menisci were excised as were the anterior cruciate ligaments. Our attention was then turned to the tibia. Using extramedullary instrumentation, a 3-degree cut was made on the proximal end of the tibia. A spacer block was placed to show gaps had been balanced and a size 4  tibial base plate was placed on the tibia and pinned into place. Intramedullary reaming guide was placed on the tibia and the appropriate reamer was used followed by the keel punch to complete the preparation of the tibia.  A trial femoral component was then impacted on to the distal end of the femur. Trial reduction was then performed with incremental size trial bearing surfaces. The orthodevelopment BKS trimax CR 8mm bearing surface was inserted and allowed for full extension, good medial, lateral stability at 90 degrees of flexion, especially medially. Our attention was then turned to the patella. The patella was sized to a 32mm patella. The guide was pinned, placed over patella, 3 holes were drilled. Trial patella button was inserted and the patella was reduced in the knee. The patella tracked normally using no-touch technique. The trial components were then all removed. The real components were opened on the back. The cut surfaces of the bone were prepared using the PulsaVac lavage. Prior to this, the Aquamantys was used to cauterize any soft tissue bleeding. 40 grams of [employer] TV[employer] HV cement was mixed. The femoral and tibial components  and patellar component was cemented into place. Excess cement was removed from around the edge of bone using plastic curette. Once the cement had hardened, the knee was placed through range of motion and noted to be stable as mentioned above with the trail components. The wound was dry, therefore no drain was used. The operative knee was injected with 20 cc of exparel. The knee was then soaked with a diluted betadine solution for approximately 3 min. This was then thoroughly irrigated. The capsular layer was closed using a #2 stratafix suture with the knee flexed 90 degrees, while subcutaneous layers were closed using 2-0 Vicryl suture. Finally the skin was closed using Prineo, which were applied in occlusive fashion and sterile bandage applied. All sponge and needle counts were correct. She was taken to the recovery room extubated in stable condition.     Signed By: Jennifer Mark MD     June 27, 2022           Operative Note    Patient: Karley Mcgee  YOB: 1950  MRN: [id number]    Date of Procedure: 6/27/2022     Pre-Op Diagnosis: RIGHT KNEE OSTEOARTHRITIS    Post-Op Diagnosis: Same as preoperative diagnosis. Procedure(s):  RIGHT TOTAL KNEE REPLACEMENT    Surgeon(s):  Jermain Shaikh MD    Surgical Assistant: Physician Assistant: JF Maldonado    Anesthesia: Spinal     Estimated Blood Loss (mL):  less than 080     Complications: None    Specimens: * No specimens in log *     Implants:   Implant Name Type Inv.  Item Serial No.  Lot No. LRB No. Used Action   CEMENT BNE 40GM FULL DOSE PMMA W/O ANTIBIO HI VISC N RADPQ - [id number]  CEMENT BNE 40GM FULL DOSE PMMA W/O ANTIBIO HI VISC N RADPQ  [employer]_ [id number] Right 2 Implanted   INSERT TIB SZ 4 THK8MM CRUCE RET BKS E VITALIZE - [id number]  INSERT TIB SZ 4 THK8MM CRUCE RET BKS E VITALIZE  [employer]WD [id number] Right 1 Implanted   COMPONENT PAT H32MM STD E VITALIZE LEONELA FELICIA RND BAL SYS - [id number]  COMPONENT PAT H32MM STD E VITALIZE LEONELA FELICIA RND BAL SYS  [employer]WD [id number] Right 1 Implanted   TRAY TIB SZ 4 NP A BAL KNEE - [id number]  TRAY TIB SZ 4 NP A BAL KNEE  [employer]WD [id number] Right 1 Implanted   COMPONENT FEM SZ 4 R NP CRUCE RET BKS TRIMAX - [id number]  COMPONENT FEM SZ 4 R NP CRUCE RET BKS TRIMAX  [employer]WD [id number] Right 1 Implanted       Drains: * No LDAs found *    Findings: djd knee    Detailed Description of Procedure:   See above note    Electronically Signed by Jacque Pickering MD on 6/27/2022 at 10:26 AM

## 2022-06-27 NOTE — ANESTHESIA PROCEDURE NOTES
Spinal Block    Start time: 6/27/2022 9:33 AM  End time: 6/27/2022 9:34 AM  Performed by: Wellington Kilgore CRNA  Authorized by: Patriciaann Peabody, MD     Pre-procedure: Indications: primary anesthetic  Preanesthetic Checklist: patient identified, risks and benefits discussed, anesthesia consent, site marked, patient being monitored and timeout performed    Timeout Time: 09:32 EDT          Spinal Block:   Patient Position:  Seated  Prep Region:  Lumbar  Prep: chlorhexidine and patient draped      Location:  L4-5  Technique:  Single shot    Local Dose (mL):  3    Needle:   Needle type: sprotte.   Needle Gauge:  24 G  Attempts:  1      Events: CSF confirmed, no blood with aspiration and no paresthesia        Assessment:  Insertion:  Uncomplicated  Patient tolerance:  Patient tolerated the procedure well with no immediate complications

## 2022-06-27 NOTE — PERIOP NOTES
Patient can wiggle toes only at this time due to spinal block. Remaining in the bed until her full sensation has returned. Had one bout of N/V. Zofran 4mg was given in PACU. Stated she felt \"much better\" afterwards. Vitals stable. Tolerating fluids and crackers.  at her side, and call bell within reach.

## 2022-06-27 NOTE — PERIOP NOTES
Reviewed PTA medication list with patient/caregiver and patient/caregiver denies any additional medications. Patient admits to having a responsible adult care for them at home for at least 24 hours after surgery. Patient encouraged to use gown warming system and informed that using said warming gown to regulate body temperature prior to a procedure has been shown to help reduce the risks of blood clots and infection. Patient's pharmacy of choice verified and documented in PTA medication section. Dual skin assessment & fall risk band verification completed with June Michael RN.

## 2022-06-27 NOTE — PERIOP NOTES
TRANSFER - IN REPORT:    Verbal report received from ORN & CRNA on Araseli Herrera  being received from OR (unit) for routine post - op      Report consisted of patients Situation, Background, Assessment and   Recommendations(SBAR). Information from the following report(s) SBAR was reviewed with the receiving nurse. Opportunity for questions and clarification was provided. Assessment completed upon patients arrival to unit and care assumed.

## 2022-06-27 NOTE — ANESTHESIA POSTPROCEDURE EVALUATION
Post-Anesthesia Evaluation and Assessment    Cardiovascular Function/Vital Signs  Visit Vitals  /81   Pulse 70   Temp 36.2 °C (97.1 °F)   Resp 18   Ht 5' 4\" (1.626 m)   Wt 94.9 kg (209 lb 4.8 oz)   SpO2 95%   BMI 35.93 kg/m²       Patient is status post Procedure(s):  RIGHT TOTAL KNEE REPLACEMENT. Nausea/Vomiting: Controlled. Postoperative hydration reviewed and adequate. Pain:  Pain Scale 1: Numeric (0 - 10) (06/27/22 1233)  Pain Intensity 1: 5 (06/27/22 1233)   Managed. Neurological Status:   Neuro (WDL): Within Defined Limits (06/27/22 1215)   At baseline. Mental Status and Level of Consciousness: Baseline and appropriate for discharge. Pulmonary Status:   O2 Device: None (Room air) (06/27/22 1247)   Adequate oxygenation and airway patent. Complications related to anesthesia: None    Post-anesthesia assessment completed. No concerns. Patient has met all discharge requirements.     Signed By: Willis Sidhu MD    June 27, 2022

## 2022-06-27 NOTE — ANESTHESIA PREPROCEDURE EVALUATION
Relevant Problems   No relevant active problems       Anesthetic History     PONV          Review of Systems / Medical History  Patient summary reviewed, nursing notes reviewed and pertinent labs reviewed    Pulmonary  Within defined limits            Pertinent negatives: No COPD, asthma and sleep apnea     Neuro/Psych   Within defined limits        Pertinent negatives: No seizures and CVA   Cardiovascular    Hypertension            Pertinent negatives: No past MI and CHF       GI/Hepatic/Renal     GERD        Pertinent negatives: No liver disease and renal disease   Endo/Other        Arthritis  Pertinent negatives: No diabetes   Other Findings              Physical Exam    Airway  Mallampati: II  TM Distance: 4 - 6 cm  Neck ROM: normal range of motion   Mouth opening: Normal     Cardiovascular               Dental    Dentition: Poor dentition, Lower partial plate and Upper partial plate     Pulmonary                 Abdominal  GI exam deferred       Other Findings            Anesthetic Plan    ASA: 2  Anesthesia type: spinal      Post-op pain plan if not by surgeon: peripheral nerve block single    Induction: Intravenous  Anesthetic plan and risks discussed with: Patient

## 2022-06-27 NOTE — DISCHARGE INSTRUCTIONS
DISCHARGE SUMMARY from Nurse    PATIENT INSTRUCTIONS:    After general anesthesia or intravenous sedation, for 24 hours or while taking prescription Narcotics:  · Limit your activities  · Do not drive and operate hazardous machinery  · Do not make important personal or business decisions  · Do  not drink alcoholic beverages  · If you have not urinated within 8 hours after discharge, please contact your surgeon on call. Report the following to your surgeon:  · Excessive pain, swelling, redness or odor of or around the surgical area  · Temperature over 100.5  · Nausea and vomiting lasting longer than 4 hours or if unable to take medications  · Any signs of decreased circulation or nerve impairment to extremity: change in color, persistent  numbness, tingling, coldness or increase pain  · Any questions    What to do at Home:  Recommended activity: Activity as tolerated, and no driving until cleared by Dr. Tiffany Lepe give a list of your current medications to your Primary Care Provider. *  Please update this list whenever your medications are discontinued, doses are      changed, or new medications (including over-the-counter products) are added. *  Please carry medication information at all times in case of emergency situations. These are general instructions for a healthy lifestyle:    No smoking/ No tobacco products/ Avoid exposure to second hand smoke  Surgeon General's Warning:  Quitting smoking now greatly reduces serious risk to your health.     Obesity, smoking, and sedentary lifestyle greatly increases your risk for illness    A healthy diet, regular physical exercise & weight monitoring are important for maintaining a healthy lifestyle    You may be retaining fluid if you have a history of heart failure or if you experience any of the following symptoms:  Weight gain of 3 pounds or more overnight or 5 pounds in a week, increased swelling in our hands or feet or shortness of breath while lying flat in bed. Please call your doctor as soon as you notice any of these symptoms; do not wait until your next office visit. The discharge information has been reviewed with the patient and caregiver. The patient and caregiver verbalized understanding. Discharge medications reviewed with the patient and caregiver and appropriate educational materials and side effects teaching were provided. Patient armband removed and shredded  ___________________________________________________________________________________________________________________________________  [id number] [facility]   Patient Discharge Instructions    Emery Koyanagi / [id number] : 1950    Admitted (Not on file) Discharged: 2022     IF YOU HAVE ANY PROBLEMS ONCE YOU ARE AT [address] [place] [address] Drive:   Main office number: [phone]    Your follow up appointment to see either Dr. Laurent Serrano PA-C, or [facility] as scheduled in 2 weeks. If you are unsure of your appointment date call the office at [phone]. Medication Instructions     · Resume your home medictions as directed, you may have directed not to resume supplements until after your follow up. · A prescription for pain medication has been given   · It is important that you take the medication exactly as they are prescribed. · Keep your medication in the bottles provided by the pharmacist and keep a list of the medication names, dosages, and times to be taken in your wallet. · Do not take other medications without consulting your doctor. What to do at [address] [place] [address] Ave your prehospital diet. If you have excessive nausea or vomitting call your doctor's office. Be sure to maintain adequate fluid intake. Some pain medications may cause constipation. Remember to drink fluids, stay as active as possible, and eat plenty of fiber-rich foods.     Begin In-Home Physical Therapy; 3 times a week to work on gait training, range of motion, strengthening, and weight bearing exercises as tolerable. Continue to use your walker or cane when walking. May progress from the walker to a cane to complete total bearing as tolerable. Patient may shower. Wrap incision with plastic wrap/covering to prevent incision from getting wet. Avoid complete immersion. YOUR DRESSING SHOULD BE CHANGED BY YOUR HOME HEALTH NURSE 5-7 AFTER SURGERY ACCORDING TO THE DATE WRITTEN ON YOUR DRESSING. When to Call    - Call if you have a temperature greater then 101  - Unable to keep food down  - Are unable to bear any wieght   - Need a pain medication refill     Information obtained by :  I understand that if any problems occur once I am at home I am to contact my physician. I understand and acknowledge receipt of the instructions indicated above.                                                                                                                                            Physician's or R.N.'s Signature                                                                  Date/Time                                                                                                                                              Patient or Representative Signature                                                          Date/Time

## 2022-06-28 ENCOUNTER — HOME CARE VISIT (OUTPATIENT)
Dept: SCHEDULING | Facility: HOME HEALTH | Age: 72
End: 2022-06-28
Payer: MEDICARE

## 2022-06-28 VITALS
TEMPERATURE: 98.6 F | SYSTOLIC BLOOD PRESSURE: 132 MMHG | HEART RATE: 66 BPM | RESPIRATION RATE: 16 BRPM | OXYGEN SATURATION: 95 % | DIASTOLIC BLOOD PRESSURE: 65 MMHG

## 2022-06-28 PROCEDURE — 3331090001 HH PPS REVENUE CREDIT

## 2022-06-28 PROCEDURE — 400013 HH SOC

## 2022-06-28 PROCEDURE — 400018 HH-NO PAY CLAIM PROCEDURE

## 2022-06-28 PROCEDURE — A6213 FOAM DRG >16<=48 SQ IN W/BDR: HCPCS

## 2022-06-28 PROCEDURE — G0151 HHCP-SERV OF PT,EA 15 MIN: HCPCS

## 2022-06-28 PROCEDURE — 3331090002 HH PPS REVENUE DEBIT

## 2022-06-28 NOTE — Clinical Note
Got it  ----- Message -----  From: Luis Alfredo Isaac, PT  Sent: 6/28/2022   8:34 PM EDT  To: Abby Christianson  [birth year]  [id number]  Dr. Monica Olmstead TKR  returns July 11    Dressing changes July 5 and July 8    3 week 2    I am off July 8th . Alexa Arreaga are you able to do this discharge and dressing change?   Thank you

## 2022-06-28 NOTE — Clinical Note
got it thanks   ----- Message -----  From: Dee Holly, PT  Sent: 6/28/2022   8:34 PM EDT  To: Kiran Ford  [birth year]  [id number]  Dr. Denilson Banerjee TKR  returns July 11    Dressing changes July 5 and July 8    3 week 2    I am off July 8th . Sunshine Bernal are you able to do this discharge and dressing change?   Thank you

## 2022-06-28 NOTE — Clinical Note
Jannette Costa  [birth year]  [id number]  Dr. Fly Rios TKR  returns July 11    Dressing changes July 5 and July 8    3 week 2    I am off July 8th . Clair Lopez Henrine Chidi are you able to do this discharge and dressing change?   Thank you

## 2022-06-29 ENCOUNTER — HOME CARE VISIT (OUTPATIENT)
Dept: SCHEDULING | Facility: HOME HEALTH | Age: 72
End: 2022-06-29
Payer: MEDICARE

## 2022-06-29 ENCOUNTER — HOME CARE VISIT (OUTPATIENT)
Dept: HOME HEALTH SERVICES | Facility: HOME HEALTH | Age: 72
End: 2022-06-29
Payer: MEDICARE

## 2022-06-29 VITALS
TEMPERATURE: 98.2 F | OXYGEN SATURATION: 98 % | HEART RATE: 84 BPM | RESPIRATION RATE: 16 BRPM | SYSTOLIC BLOOD PRESSURE: 142 MMHG | DIASTOLIC BLOOD PRESSURE: 80 MMHG

## 2022-06-29 PROCEDURE — 3331090002 HH PPS REVENUE DEBIT

## 2022-06-29 PROCEDURE — 3331090001 HH PPS REVENUE CREDIT

## 2022-06-29 PROCEDURE — G0157 HHC PT ASSISTANT EA 15: HCPCS

## 2022-06-29 NOTE — HOME HEALTH
SUBJECTIVE: \"The girl came with the bike today and I did the bike for a while. I ended up falling asleep afterwards    WOUND:R knee covered in the AQUACEL AG bandage  . NO DRAINAGE NOTED. NO S/S OF INFECTION NOTED. DRESSING TO BE CHANGED JULY 5, AND JULY 8, 2022    ROM AT INITIAL EVAL: R knee flexion in supine heel slide 88 degrees  R knee flexion in sitting 95 degrees  R knee extension in supine quad set -13   R knee extensor lag with LAQ -23 degrees     OBJECTIVE: see interventions for further details    GAIT: Pt amb with FWW support over level houshold surfaces with step through gait pattern, full foot clearnace, WNL MICHAEL, slightly decreased leena and step length, good heelstrike and toe off noted, S level. THER EX: Supine ther ex AROM with verbal and visual cueing for quality of repititon. THER EX to include DFPF, HEELSLIDES, QUAD SETS, HS PRESSES, GLUTE SQUEEZES 2X10. Pt able to complete all ther ex with good tolerance to activity and no increased pain. SA02 on RA 95%, HR 82    TRANSFERS: Sit to stand from bed and chair using BUE's to push off, BLE's flexed but slight weight shift to L side to unweight R. Pt demonstrates G initial standing balance and reaches for walker once up. PATIENT REPONSE TO TREATMENT: Pt is pleasant and motivated for treatment    PT LEVEL OF UNDERSTANDING OF EDUCATION PROVIDED; Pt appears cognitive and able to understand all teaching provided    ASSESSMENT OF PROGRESS TOWARDS GOALS: Pt is making excellent progress with PT services by performance of supine ther ex and overall compliance with progrram thus far. Pt is compliant with icing and medication protocal. Pt was able to perform supine ther ex with good tolerance to activity today, and with good overall execution. Pt was instructed in elevating RLE over the back of the couch keeping it straight, and icing at the same time. Pt will most likely be progressed to a cane next visit.       CONTINUED NEED FOR FOR FOLLOWING SKILLS: Pt to be seen by PT services 3x2  to address  dx and clinical findings: decreased ROM, decreased strength, impaired gait, decreased ability w stair negotiation, increased swelling, decreased transfer status, decreased endurance, decreased balance and decreased safety, increased pain in order to improve functional mobility/quality of life, decrease burden of care, reduce risk for re-hospitalization, work towards patient's personal goals of return to PLOF w decrease risk for falls      PLAN FOR NEXT VISIT: GAIT TRAINING WITH CANE SUPPORT, GONIOMETRIC MEASUREMENT

## 2022-06-29 NOTE — HOME HEALTH
S: patient reports that she is doing well - having some increased pan compared to yesterday - she reports that she is icing consistently   O: PAIN: patient is taking pain medication on schedule, educated patient on use of ice for pain and edema  patient to apply ice to the right knee for pain and swelling control per MD protocol using a barrier to protect the skin. Patient to monitor the skin for any adverse effects such as color changes, irritation, mottled appearance. Patient to use ice for 20 minutes an hour for the first 2-3 days and then can reduce to 20 minutes 4-5 times a day. WOUND:R knee covered in the mepilex bandage  - wound not visible. surrouding the bandage there is moderate swelling without redness. per MD orders did not remove the bandage but educated patient on signs of infection and to NOT remove the bandage   dressing changes 7/5 and 7/8  ROM: R knee flexion in supine heel slide 88 degrees  R knee flexion in sitting 95 degrees  R knee extension in supine quad set -13   R knee extensor lag with LAQ -23 degrees   STRENGTH: R knee ext 3-/5, R knee flexion 3-/5, R hip flex 4-/5, R hip abd/adduction 4-/5  L knee flexion and extension 5/5, L hip flexion/abduction and addection 5/5  BED MOBILITY: patient demonstrated independence with bed mobility. EQUIPMENT: FWW, [facility]  TRANSFERS: patient completed sit to stand from the bed, chair and the toilet with exaggerated MICHAEL, maintenance of the R LE extended out in front of her, full weight shift to the left LE to unweight the R LE. Requires use of walker for initial stance for balance, safety and to unweight the R LE.    GAIT: ambulating with FWW with reduced weight bearing through the R LE, step to pattern with use of hip hiking for R LE swing through, minimal knee flexion in R swing, and reduced terminal knee extension on the R for initial contact/terminal swing. with visual cues for increasing continuity she was able to demonstrate a more fluid gait pattern with step through pattern but with continued asymmetrical step length right greater than left- she walked in the home for a total of 50 feet with  SBA and cues for correction  STEPS: not tested - patient also has a stair lift that she used to get up to the main living level when she came home   BALANCE: tinetti 12/28 high fall risk  RESPONSE TO TREATMENT:patient demonstrated a positive outcome post treatment and reported a reduction in pain, increased comfort and increased confidence with transfers and mobility. Patient reported good understanding of the HEP and reports confidence in ability to complete the program as prescribed by PT  RESPONSE TO EDUCATION: patient was educated on: safety, fall risk, plan of care and signs of infection  Patient expressed understanding of all education provided and was able to repeat back education, precautions, and HEP. A:ASSESSMENT AND PROGRESS TOWARD GOALS:  Patient demonstrated a positive result to therapy this date as evidenced by an improvement in gait pattern with cues, decreased pain with exercise and walking and patient expressing an understanding of the rehab plan due to therapy verbal and written instructions. Goals established for increased independence in the home, safe mobility in the home, improvement in strength and ROM - all designed to reduce fall risk and progress toward independence. Patient will benefit from continued PT intervention to progress toward meeting all established goals    P:.continue with progression of mobility, ther ex for strength, ROM, provide education to patient and caregivers to maximize the recovery and reduce the fall risk to progress toward a return to independent function    Skilled services/Home bound verification: MD referral for HHPT following recent hospital stay.  HHPT is medically necessary to address  dx and clinical findings: decreased ROM, decreased strength, impaired gait, decreased ability w stair negotiation, increased swelling, decreased transfer status, decreased endurance, decreased balance and decreased safety, increased pain in order to improve functional mobility/quality of life, decrease burden of care, reduce risk for re-hospitalization, work towards patient's personal goals of return to PLOF w decrease risk for falls. Skilled Reason for admission/summary of clinical condition:  s/p R TKR by Dr Madai Duron: patient lives with her  in a 2 story condo with steps to enter and egress. The garage is on the first floor and the whole living area is on the second floor. Her  has cognitive deficits so her main caregiver is her son that lives nearby- he assists as needed with meals, transportation, mobility and ADLs    Medications reconciled and all medications are available in the home this visit. The following education was provided regarding medications, medication interactions, and look a like medications:Meds are effective at this time. no discrepancies noted  patient was educated on risks and side effects of oxycodone and the need to follow the prescription as written by the MD    Home health supplies ordered/delivered this visit include: mpeilex x 2    Patient education provided: safety, fall risk, HEP. Patient/caregiver degree of understanding:good    Home exercise program: patient educated on and demonstrated good technqiue with verbal cues of the following exercises: supine ankle pumps, quad sets, heel slides, SLR  sitting LAQ and knee flexion under the chair   educated on need to complete all exercises 3 times a day for 15 reps each. patient is to walk each hour during the day and is to ice at least 4 times a day for edema control. Pt/Caregiver instructed on plan of care and are agreeable to plan of care      Plan of care called to attending MD: Dr Devin Sanchez written copy of the [facility] of Rights was given and verbally reviewed on this visit.   . The patient or representative was given the opportunity to ask pertinent questions regarding the bill of rights. [place] of rights information was received by patient    Discharge planning discussed with patient and caregiver. DC to self and family under MD supervision when all goals are met or maximum potential is reached. Pt/Caregiver did verbalize understanding of DC planning. Next MD appointment 7/11/22 with Dr Joni Rush . Patient/caregiver instructed to keep appointment as lack of follow through with MD appointment could result in discontinuation of home care services for non-compliance.

## 2022-06-30 PROCEDURE — 3331090002 HH PPS REVENUE DEBIT

## 2022-06-30 PROCEDURE — 3331090001 HH PPS REVENUE CREDIT

## 2022-07-01 ENCOUNTER — HOME CARE VISIT (OUTPATIENT)
Dept: SCHEDULING | Facility: HOME HEALTH | Age: 72
End: 2022-07-01
Payer: MEDICARE

## 2022-07-01 VITALS
SYSTOLIC BLOOD PRESSURE: 122 MMHG | DIASTOLIC BLOOD PRESSURE: 78 MMHG | HEART RATE: 80 BPM | TEMPERATURE: 98.3 F | RESPIRATION RATE: 16 BRPM | OXYGEN SATURATION: 96 %

## 2022-07-01 PROCEDURE — G0157 HHC PT ASSISTANT EA 15: HCPCS

## 2022-07-01 PROCEDURE — 3331090001 HH PPS REVENUE CREDIT

## 2022-07-01 PROCEDURE — 3331090002 HH PPS REVENUE DEBIT

## 2022-07-01 NOTE — HOME HEALTH
SUBJECTIVE: \"I put myself on the cane yesterday. The swelling has gone down since I started the pineapple juice. Today I have a little bit of a headache. \"    DMCWP:X knee covered in the AQUACEL AG bandage  . NO DRAINAGE NOTED. NO S/S OF INFECTION NOTED. DRESSING TO BE CHANGED JULY 5, AND JULY 8, 2022         ROM AT INITIAL EVAL: R knee flexion in supine heel slide 88 degrees    R knee flexion in sitting 95 degrees    R knee extension in supine quad set -13     R knee extensor lag with LAQ -23 degrees     GONIOMETRIC MEASURMENT AROM IN SUPINE RLE 7/1/2022 -5-92  R KNEE EXTENSION LAG -5         OBJECTIVE: see interventions for further details         GAIT: Pt amb with 1 SPC  support in L hand over level houshold surfaces with step through gait pattern, full foot clearnace, WNL MICHAEL, slightly decreased leena and step length, good heelstrike and toe off noted, S level. THER EX:THER EX implemented to increase strength and further increase functional mobility and safety with transfers and gait. Pt instructed in higher level standng ther ex AROM with 2 UE support at kitchen counter with verbal cueing for quality of repitition. THER EX to include SANDRA, DFPF, SHORT SQUATS, HIP FLEXION AND HS CURLS 2X10. Gunner Taylor Pt instructed in supine heelslides and quad sets before measurement. . Pt able to complete all ther ex with good tolerance to activity and no increased pain. SA02 on RA 95%, HR 82         TRANSFERS: Sit to stand from bed and chair using BUE's to push off, BLE's flexed but slight weight shift to L side to unweight R MOD I. Pt demonstrates G initial standing balance and reaches for walker once up. Pt is showering independantly.  Pt is independant for all transfers in the home         PATIENT REPONSE TO TREATMENT: Pt is pleasant and motivated for treatment         PT LEVEL OF UNDERSTANDING OF EDUCATION PROVIDED; Pt appears cognitive and able to understand all teaching provided         [address] GOALS: Pt is making excellent progress with PT services by performance of standing ther ex and progression of gait to pt is now walking with cane support. . Pt is compliant with icing and medication protocal. Pt was able to perform standing ther ex with good tolerance to activity today, and with good overall execution. Pt was instructed to continue with HEP in supine and sitting for now, and continue with walking with cane as tolerated. Pt instructed to revert back to walker as needed.           CONTINUED NEED FOR FOR FOLLOWING SKILLS: Pt to be seen by PT services 3x2  to address  dx and clinical findings: decreased ROM, decreased strength, impaired gait, decreased ability w stair negotiation, increased swelling, decreased transfer status, decreased endurance, decreased balance and decreased safety, increased pain in order to improve functional mobility/quality of life, decrease burden of care, reduce risk for re-hospitalization, work towards patient's personal goals of return to PLOF w decrease risk for falls       PLAN FOR NEXT VISIT: DRESSING CHANGE, GONIOMETRIC MEASUREMENT, GAIT OUTSIDE, GIVE WRITTEN PROGRAM FOR STANDING THER EX

## 2022-07-02 PROCEDURE — 3331090002 HH PPS REVENUE DEBIT

## 2022-07-02 PROCEDURE — 3331090001 HH PPS REVENUE CREDIT

## 2022-07-03 PROCEDURE — 3331090002 HH PPS REVENUE DEBIT

## 2022-07-03 PROCEDURE — 3331090001 HH PPS REVENUE CREDIT

## 2022-07-04 PROCEDURE — 3331090002 HH PPS REVENUE DEBIT

## 2022-07-04 PROCEDURE — 3331090001 HH PPS REVENUE CREDIT

## 2022-07-05 ENCOUNTER — HOME CARE VISIT (OUTPATIENT)
Dept: SCHEDULING | Facility: HOME HEALTH | Age: 72
End: 2022-07-05
Payer: MEDICARE

## 2022-07-05 VITALS
SYSTOLIC BLOOD PRESSURE: 142 MMHG | RESPIRATION RATE: 16 BRPM | DIASTOLIC BLOOD PRESSURE: 80 MMHG | TEMPERATURE: 98.3 F | HEART RATE: 81 BPM | OXYGEN SATURATION: 98 %

## 2022-07-05 PROCEDURE — G0157 HHC PT ASSISTANT EA 15: HCPCS

## 2022-07-05 PROCEDURE — 3331090001 HH PPS REVENUE CREDIT

## 2022-07-05 PROCEDURE — 3331090002 HH PPS REVENUE DEBIT

## 2022-07-05 NOTE — Clinical Note
thanks for the update  ----- Message -----  From: David Stoner PTA  Sent: 7/6/2022   8:18 AM EDT  To: Paulina Britton PT      ASSESSMENT OF PROGRESS TOWARDS GOALS: Pt is making excellent progress with PT services by performance of extended gait outside surfaces with cane support with good tolerance, and performance of SUV transfer with no difficulty. Dressing change was performed with no complications. ROM of R knee AROM in supine is currently 0-95 degrees .  Pt is still limited by swelling, but continues to ice and elevate per protocal.

## 2022-07-05 NOTE — Clinical Note
yw  ----- Message -----  From: Tre Lomeli PT  Sent: 7/6/2022   2:37 PM EDT  To: Clayton Black PTA  Subject: RE:                                                thanks for the update  ----- Message -----  From: Norman Green PTA  Sent: 7/6/2022   8:18 AM EDT  To: Mookie Andrade PT      ASSESSMENT OF PROGRESS TOWARDS GOALS: Pt is making excellent progress with PT services by performance of extended gait outside surfaces with cane support with good tolerance, and performance of SUV transfer with no difficulty. Dressing change was performed with no complications. ROM of R knee AROM in supine is currently 0-95 degrees .  Pt is still limited by swelling, but continues to ice and elevate per protocal.

## 2022-07-05 NOTE — HOME HEALTH
SUBJECTIVE: \"I have been doing really good. I had some company over the weekend. \"       POHNM:X knee covered in the AQUACEL AG bandage  . NO DRAINAGE NOTED. NO S/S OF INFECTION NOTED. DRESSING TO BE CHANGED JULY 5, AND JULY 8, 2022. DRESSING REMOVED and scant bloody drainage noted on bandage. Incision intact and well articulated with zip line dressing in place. No drainage noted. No s/s of infection noted. New Mepiliex applied. Next dressing change scheduled 7/82022    Home exercise program: patient educated on and demonstrated good technqiue with verbal cues of the following exercises: supine ankle pumps, quad sets, heel slides, SLR  sitting LAQ and knee flexion under the chair   educated on need to complete all exercises 3 times a day for 15 reps each. patient is to walk each hour during the day and is to ice at least 4 times a day for edema control  STANDING SANDRA, DFPF, HIP FLEXION, SHORT SQUATS, HS CURLS 3X10 2X PER DAY     ROM AT INITIAL EVAL: R knee flexion in supine heel slide 88 degrees  R knee flexion in sitting 95 degrees  R knee extension in supine quad set -13   R knee extensor lag with LAQ -23 degrees       GONIOMETRIC MEASURMENT AROM IN SUPINE RLE 7/5/2022 -0-95       OBJECTIVE: see interventions for further details       Pt signed [facility] 7/1/2022           GAIT: Pt amb with 1 SPC  support in L hand over level houshold surfaces with step through gait pattern, full foot clearnace, WNL MICHAEL,  WNL  candence and step length, good heelstrike and toe off noted, MOD I  level over houshold surfaces and distant S 50% to MOD I 50% over outside surfaces with occasional cueing for cane managment over unlevel surfaces. .        STEPS; UP/DOWN 14 STEPS in home and 2 flights steps leading out of home with 1 SPC support and 1 HR step by step up and down leading up with LLE and down with RLE MOD I. THER EX: Review of HEP. Pt is compliant with HEP .  Pt instructed in supine heelslides and quad sets 1x10 before measurment. GONIOMETRIC MEASURMENT AROM IN SUPINE 0-95 RLE       TRANSFERS:SUV transfer from passenger and drivers side I with effiency. PATIENT REPONSE TO TREATMENT: Pt is pleasant and motivated for treatment       PT LEVEL OF UNDERSTANDING OF EDUCATION PROVIDED; Pt appears cognitive and able to understand all teaching provided         ASSESSMENT OF PROGRESS TOWARDS GOALS: Pt is making excellent progress with PT services by performance of extended gait outside surfaces with cane support with good tolerance, and performance of SUV transfer with no difficulty. Dressing change was performed with no complications. ROM of R knee AROM in supine is currently 0-95 degrees .  Pt is still limited by swelling, but continues to ice and elevate per protocal.       CONTINUED NEED FOR FOR FOLLOWING SKILLS: Pt to be seen by PT services 3x2  to address  dx and clinical findings: decreased ROM, decreased strength, ncreased swelling, s, decreased endurance, decreased balance and decreased safety, increased pain in order to improve functional mobility/quality of life, decrease burden of care, reduce risk for re-hospitalization, work towards patient's personal goals of return to PLOF w decrease risk for falls       PLAN FOR NEXT VISIT: , INCREASE ROM OF R KNEE

## 2022-07-06 PROCEDURE — 3331090002 HH PPS REVENUE DEBIT

## 2022-07-06 PROCEDURE — 3331090001 HH PPS REVENUE CREDIT

## 2022-07-06 NOTE — CASE COMMUNICATION
ASSESSMENT OF PROGRESS TOWARDS GOALS: Pt is making excellent progress with PT services by performance of extended gait outside surfaces with cane support with good tolerance, and performance of SUV transfer with no difficulty. Dressing change was performed with no complications. ROM of R knee AROM in supine is currently 0-95 degrees .  Pt is still limited by swelling, but continues to ice and elevate per protocal.

## 2022-07-07 ENCOUNTER — HOME CARE VISIT (OUTPATIENT)
Dept: HOME HEALTH SERVICES | Facility: HOME HEALTH | Age: 72
End: 2022-07-07
Payer: MEDICARE

## 2022-07-07 VITALS
SYSTOLIC BLOOD PRESSURE: 145 MMHG | TEMPERATURE: 97.8 F | RESPIRATION RATE: 16 BRPM | DIASTOLIC BLOOD PRESSURE: 82 MMHG | OXYGEN SATURATION: 97 % | HEART RATE: 69 BPM

## 2022-07-07 PROCEDURE — G0157 HHC PT ASSISTANT EA 15: HCPCS

## 2022-07-07 PROCEDURE — 3331090002 HH PPS REVENUE DEBIT

## 2022-07-07 PROCEDURE — 3331090001 HH PPS REVENUE CREDIT

## 2022-07-07 NOTE — HOME HEALTH
SUBJECTIVE: \" [place] \"       WOUND:R knee covered in the  [address] bandage  . NO DRAINAGE NOTED. NO S/S OF INFECTION NOTED. NEXT [facility] 8, 2022. Home exercise program: patient educated on and demonstrated good technqiue with verbal cues of the following exercises: supine ankle pumps, quad sets, heel slides, SLR    sitting LAQ and knee flexion under the chair     educated on need to complete all exercises 3 times a day for 15 reps each. patient is to walk each hour during the day and is to ice at least 4 times a day for edema control    STANDING SANDRA, DFPF, HIP FLEXION, SHORT SQUATS, HS CURLS 3X10 2X PER DAY          ROM AT INITIAL EVAL: R knee flexion in supine heel slide 88 degrees    R knee flexion in sitting 95 degrees    R knee extension in supine quad set -13     R knee extensor lag with LAQ -23 degrees        GONIOMETRIC MEASURMENT AROM IN SUPINE RLE 7/7/2022 -0-104  RLE EXTENSION LAG -4       OBJECTIVE: see interventions for further details         Pt signed [facility] 7/1/2022        GAIT: Pt amb with 1 SPC  support in L hand over level houshold surfaces and outside community surfaces street, sidewalk and grass with step through gait pattern, full foot clearnace, WNL MICHAEL,  WNL  candence and step length, good heelstrike and toe off noted, MOD I        STEPS; UP/DOWN 14 STEPS in home and 2 flights steps leading out of home with 1 SPC support and 1 HR step by step up and down leading up with LLE and down with RLE MOD I. THER EX: Review of HEP. Pt is compliant with HEP . Pt instructed in supine heelslides and quad sets 1x10 before measurment. GONIOMETRIC MEASURMENT AROM IN SUPINE 0-104,RLE EXTENSION LAG -4 RLE. MMT R QUAD 4+/5, R HS 4/5, R HIP FLEXION 4+/5, , R HIP ABDUCTION 5/5 , R HIP ADDUCTION 5/5            PATIENT REPONSE TO TREATMENT: Pt is pleasant and motivated for treatment              PT LEVEL OF UNDERSTANDING OF EDUCATION PROVIDED; Pt appears cognitive and able to understand all teaching provided       ASSESSMENT OF PROGRESS TOWARDS GOALS: Pt has made excellent progress with PT services by pt is independant with gait with cane support outside and sometimes using no AD at all in home once up and walking. Pt is independant for steps, and all transfers in and out of the home. ROM of R knee AROM in supine is currently 0-104 degrees . Pt is compliant with HEP and icing protocal. Pt to follow up with Ortho and follow directions based upon that visit.         CONTINUED NEED FOR FOR FOLLOWING SKILLS: Pt to be seen by PT services 3x2  to address final disharge       PLAN FOR NEXT VISIT: ,FINAL PT DISCHARGE

## 2022-07-08 ENCOUNTER — HOME CARE VISIT (OUTPATIENT)
Dept: SCHEDULING | Facility: HOME HEALTH | Age: 72
End: 2022-07-08
Payer: MEDICARE

## 2022-07-08 VITALS
RESPIRATION RATE: 12 BRPM | HEART RATE: 88 BPM | DIASTOLIC BLOOD PRESSURE: 64 MMHG | OXYGEN SATURATION: 95 % | TEMPERATURE: 96.9 F | SYSTOLIC BLOOD PRESSURE: 122 MMHG

## 2022-07-08 PROCEDURE — G0151 HHCP-SERV OF PT,EA 15 MIN: HCPCS

## 2022-07-08 PROCEDURE — 3331090001 HH PPS REVENUE CREDIT

## 2022-07-08 PROCEDURE — 3331090002 HH PPS REVENUE DEBIT

## 2022-07-08 NOTE — HOME HEALTH
SUBJECTIVE: I am doing well today. On go see the ortho on Monday  REQUIRES CAREGIVER ASSISTANCE FOR: transportation, IADLS   MEDICATIONS REVIEWED AND RECONCILED no changes   NEXT MD APPT: Dr José Miguel Hargrove  7/11/22   ROM: R knee in sitting 0-105 degrees  STRENGTH: R hip flexors, quads and hamstrings 5/5  WOUNDS:Removed Mepilex dressing using clean technique. No drainage noted. No signs or symptoms of infection noted. Zip tie sturcture intact. Replaced Mepilex dressing using clean technique   BED MOBILITY:supine to sit and sit to supine MOD I  TRANSFERS:sit to stand from chair x3 wiith B UE support for push off MOD I .sit to stand from dinning room chair x 3 without B UE support MOD I. pt ocassaional extends R LE fwd on trasfers standing to sit transfers, but when reminded is able to perfrom without extending leg  GAIT: Pt amb household distances without A DEV with reciprocal gait pattern MOD I. No balance checks noted with amb normal leena and B step length noted. Per PTA visit 7/5/22 Pt amb with 1 SPC support in L hand over level houshold surfaces with step through gait pattern, full foot clearnace, WNL MICHAEL, WNL candence and step length, good heelstrike and toe off noted, MOD I level over houshold surfaces and distant S 50% to MOD I 50% over outside surfaces with occasional cueing for cane managment over unlevel surfaces. Gunner Brandon STAIRS:Per PTA visit 7/5/22 STEPS; UP/DOWN 14 STEPS in home and 2 flights steps leading out of home with 1 SPC support and 1 HR step by step up and down leading up with LLE and down with RLE MOD I. BALANCE: Pt scored 28/28 on Tinetti Balance Assessment placing pt at low risk for falls.    PATIENT RESPONE TO TX: Pt pain level remained the same throughout tx sesssion  PATIENT LEVEL OF UNDERSTANDING OF EDUCATION PROVIDED Pt  verbalized use of SPC at all times when amb in public for safety  PATIENT EDUCATION PROVIDED THIS VISIT: safety, HEP, walking, deep breathing,        HEP consisting of:  supine ankle pumps, quad sets, heel slides, SLR  sitting LAQ and knee flexion under the chair   educated on need to complete all exercises 3 times a day for 15 reps each. patient is to walk each hour during the day and is to ice at least 4 times a day for edema control  STANDING SANDRA, DFPF, HIP FLEXION, SHORT SQUATS, HS CURLS 3X10 2X PER DAY   Written HEP issued, patient/caregiver verbalized understanding. Patient is s/p R TKR and has been treated for ROM, strengthening, gait training, stair training, HEP training, safety training, and balance training. On [facility]  6/28/22  AROM was R knee flexion in supine heel slide 88 degrees, R knee flexion in sitting 95 degrees, R knee extension in supine quad set -13 , R knee extensor lag with LAQ -23 degrees. Today at [place] AROM is   R knee in sitting 0-105 degrees Pt made progress but did not met goal of 0-110 degrees  On SOC strength was  R knee ext 3-/5, R knee flexion 3-/5, R hip flex 4-/5, R hip abd/adduction 4-/5, L knee flexion and extension 5/5, L hip flexion/abduction and addection 5/5. Today at [place] strength is R hip flexors, quads and hamstrings 5/5. On [facility] bed mobility was patient demonstrated independence with bed mobility. Today at [place] bed mobility is supine to sit and sit to supine MOD I. On [facility] transfers were patient completed sit to stand from the bed, chair and the toilet with exaggerated MICHAEL, maintenance of the R LE extended out in front of her, full weight shift to the left LE to unweight the R LE. Requires use of walker for initial stance for balance, safety and to unweight the R LE. Adan Luther  Today at [place] transfers are sit to stand from chair x3 wiith B UE support for push off MOD I .sit to stand from dinning room chair x 3 without B UE support MOD I. pt ocassaional extends R LE fwd on trasfers standing to sit transfers, but when reminded is able to perfrom without extending leg  On SOC gait was ambulating with FWW with reduced weight bearing through the R LE, step to pattern with use of hip hiking for R LE swing through, minimal knee flexion in R swing, and reduced terminal knee extension on the R for initial contact/terminal swing. with visual cues for increasing continuity she was able to demonstrate a more fluid gait pattern with step through pattern but with continued asymmetrical step length right greater than left- she walked in the home for a total of 50 feet with  SBA and cues for correction. Today at [facility] gait is  Pt amb household distances without A DEV with reciprocal gait pattern MOD I. No balance checks noted with amb normal leena and B step length noted. Per PTA visit 7/5/22 Pt amb with 1 SPC support in L hand over level houshold surfaces with step through gait pattern, full foot clearnace, WNL MICHAEL, WNL candence and step length, good heelstrike and toe off noted, MOD I level over houshold surfaces and distant S 50% to MOD I 50% over outside surfaces with occasional cueing for cane managment over unlevel surfaces. . . On SOC stairs are werenot tested - patient also has a stair lift that she used to get up to the main living level when she came home. Per PTA visit 7/5/22 STEPS; UP/DOWN 14 STEPS in home and 2 flights steps leading out of home with 1 SPC support and 1 HR step by step up and down leading up with LLE and down with RLE MOD I. . On [facility]  tinetti 12/28 high fall risk. Today at [place] pt scored a 28/28 on Tinetti Balance Assessment placing pt as a low  fall risk. .  Pt did have more than a 4 point statistical improvement. Pt has made progress and has met all goals. Discharge plan: Discharge from [address] [place] [address] services as all goals have been met.   Dr Sanaz Thurman  office has been notified of DC from [place] [id number] with goals met

## 2022-07-08 NOTE — Clinical Note
Thanks!  ----- Message -----  From: Ramos Thornton, PT  Sent: 7/8/2022   1:40 PM EDT  To: Jackie Parks PTA      Patient is s/p R TKR and has been treated for ROM, strengthening, gait training, stair training, HEP training, safety training, and balance training. On [facility]  6/28/22  AROM was R knee flexion in supine heel slide 88 degrees, R knee flexion in sitting 95 degrees, R knee extension in supine quad set -13 , R knee extensor lag with LAQ -23 degrees. Today at [place] AROM is   R knee in sitting 0-105 degrees Pt made progress but did not met goal of 0-110 degrees  On SOC strength was  R knee ext 3-/5, R knee flexion 3-/5, R hip flex 4-/5, R hip abd/adduction 4-/5, L knee flexion and extension 5/5, L hip flexion/abduction and addection 5/5. Today at [place] strength is R hip flexors, quads and hamstrings 5/5. On [facility] bed mobility was patient demonstrated independence with bed mobility. Today at [place] bed mobility is supine to sit and sit to supine MOD I. On [facility] transfers were patient completed sit to stand from the bed, chair and the toilet with exaggerated MICHAEL, maintenance of the R LE extended out in front of her, full weight shift to the left LE to unweight the R LE. Requires use of walker for initial stance for balance, safety and to unweight the R LE. Ilan Oneal  Today at [place] transfers are sit to stand from chair x3 wiith B UE support for push off MOD I .sit to stand from dinning room chair x 3 without B UE support MOD I. pt ocassaional extends R LE fwd on trasfers standing to sit transfers, but when reminded is able to perfrom without extending leg  On SOC gait was ambulating with FWW with reduced weight bearing through the R LE, step to pattern with use of hip hiking for R LE swing through, minimal knee flexion in R swing, and reduced terminal knee extension on the R for initial contact/terminal swing. with visual cues for increasing continuity she was able to demonstrate a more fluid gait pattern with step through pattern but with continued asymmetrical step length right greater than left- she walked in the home for a total of 50 feet with  SBA and cues for correction. Today at [place] gait is  Pt amb household distances without A DEV with reciprocal gait pattern MOD I. No balance checks noted with amb normal leena and B step length noted. Per PTA visit 7/5/22 Pt amb with 1 SPC support in L hand over level houshold surfaces with step through gait pattern, full foot clearnace, WNL MICHAEL, WNL candence and step length, good heelstrike and toe off noted, MOD I level over houshold surfaces and distant S 50% to MOD I 50% over outside surfaces with occasional cueing for cane managment over unlevel surfaces. . . On SOC stairs are werenot tested - patient also has a stair lift that she used to get up to the main living level when she came home. Per PTA visit 7/5/22 STEPS; UP/DOWN 14 STEPS in home and 2 flights steps leading out of home with 1 SPC support and 1 HR step by step up and down leading up with LLE and down with RLE MOD I. . On [facility]  tinetti 12/28 high fall risk. Today at [place] pt scored a 28/28 on Tinetti Balance Assessment placing pt as a low  fall risk. .  Pt did have more than a 4 point statistical improvement. Pt has made progress and has met all goals. Discharge plan: Discharge from [address] [place] [address] services as all goals have been met.   Dr Branden Patterson  office has been notified of DC from [place] [id number] with goals met

## 2022-07-08 NOTE — Clinical Note
Patient is s/p R TKR and has been treated for ROM, strengthening, gait training, stair training, HEP training, safety training, and balance training. On [facility]  6/28/22  AROM was R knee flexion in supine heel slide 88 degrees, R knee flexion in sitting 95 degrees, R knee extension in supine quad set -13 , R knee extensor lag with LAQ -23 degrees. Today at [place] AROM is   R knee in sitting 0-105 degrees Pt made progress but did not met goal of 0-110 degrees  On SOC strength was  R knee ext 3-/5, R knee flexion 3-/5, R hip flex 4-/5, R hip abd/adduction 4-/5, L knee flexion and extension 5/5, L hip flexion/abduction and addection 5/5. Today at [place] strength is R hip flexors, quads and hamstrings 5/5. On [facility] bed mobility was patient demonstrated independence with bed mobility. Today at [place] bed mobility is supine to sit and sit to supine MOD I. On [facility] transfers were patient completed sit to stand from the bed, chair and the toilet with exaggerated MICHAEL, maintenance of the R LE extended out in front of her, full weight shift to the left LE to unweight the R LE. Requires use of walker for initial stance for balance, safety and to unweight the R LE. Shahnaz Armenta  Today at [place] transfers are sit to stand from chair x3 wiith B UE support for push off MOD I .sit to stand from dinning room chair x 3 without B UE support MOD I. pt ocassaional extends R LE fwd on trasfers standing to sit transfers, but when reminded is able to perfrom without extending leg  On SOC gait was ambulating with FWW with reduced weight bearing through the R LE, step to pattern with use of hip hiking for R LE swing through, minimal knee flexion in R swing, and reduced terminal knee extension on the R for initial contact/terminal swing. with visual cues for increasing continuity she was able to demonstrate a more fluid gait pattern with step through pattern but with continued asymmetrical step length right greater than left- she walked in the home for a total of 50 feet with SBA and cues for correction. Today at [place] gait is  Pt amb household distances without A DEV with reciprocal gait pattern MOD I. No balance checks noted with amb normal leena and B step length noted. Per PTA visit 7/5/22 Pt amb with 1 SPC support in L hand over level houshold surfaces with step through gait pattern, full foot clearnace, WNL MIHCAEL, WNL candence and step length, good heelstrike and toe off noted, MOD I level over houshold surfaces and distant S 50% to MOD I 50% over outside surfaces with occasional cueing for cane managment over unlevel surfaces. . . On SOC stairs are werenot tested - patient also has a stair lift that she used to get up to the main living level when she came home. Per PTA visit 7/5/22 STEPS; UP/DOWN 14 STEPS in home and 2 flights steps leading out of home with 1 SPC support and 1 HR step by step up and down leading up with LLE and down with RLE MOD I. . On [facility]  tinetti 12/28 high fall risk. Today at [place] pt scored a 28/28 on Tinetti Balance Assessment placing pt as a low  fall risk. .  Pt did have more than a 4 point statistical improvement. Pt has made progress and has met all goals. Discharge plan: Discharge from [address] [place] [address] Carloz Alvarenga services as all goals have been met.   Dr Ranjit Johnson  office has been notified of DC from [facility] with goals met

## 2022-07-14 ENCOUNTER — HOSPITAL ENCOUNTER (OUTPATIENT)
Dept: PHYSICAL THERAPY | Age: 72
Discharge: HOME | End: 2022-07-14
Payer: MEDICARE

## 2022-07-14 PROCEDURE — 97162 PT EVAL MOD COMPLEX 30 MIN: CPT

## 2022-07-14 PROCEDURE — 97110 THERAPEUTIC EXERCISES: CPT

## 2022-07-14 NOTE — PROGRESS NOTES
PHYSICAL THERAPY - DAILY TREATMENT NOTE    Patient Name: Marlyn Wright        Date: 2022  : 1950   YES Patient  Verified  Visit #:      12  Insurance: Payor: Barry Oconnor / Plan: [place] MED[place]ARE PART [place] & B / Product Type: Medicare /      In time: 1100 Out time: 1150   Total Treatment Time: 50     BCBS/Medicare Time Tracking (below)   Total Timed Codes (min):  40 1:1 Treatment Time:  40     TREATMENT AREA =  Pain in right knee [M25.561]    SUBJECTIVE  Pain Level (on 0 to 10 scale):  0-4  / 10   Medication Changes/New allergies or changes in medical history, any new surgeries or procedures? NO    If yes, update Summary List   Subjective Functional Status/Changes:  []  No changes reported     Patient is a 70 y.o. female who presents to [facility] s/p R TKA on 2022. Pt reports receiving HHPT for 2 weeks and performing all therex in standing. Pt reports pain ranging from 0-4/10 described as an aching/pressure pain that increases with inc time on feet          Modalities Rationale:     decrease edema, decrease inflammation and decrease pain to improve patient's ability to perform pain-free ADLs.     min [] Estim, type/location:                                      []  att     []  unatt     []  w/US     []  w/ice    []  w/heat    min []  Mechanical Traction: type/lbs                   []  pro   []  sup   []  int   []  cont    []  before manual    []  after manual    min []  Ultrasound, settings/location:      min []  Iontophoresis w/ dexamethasone, location:                                               []  take home patch       []  in clinic   10 min [x]  Ice     []  Heat    location/position: R knee supine    min []  Vasopneumatic Device, press/temp:    If using vaso (only need to measure limb vaso being performed on)      pre-treatment girth :       post-treatment girth :       measured at (landmark location) :      min []  Other:    [x] Skin assessment post-treatment (if applicable):    [x]  intact    [x]  redness- no adverse reaction                  []redness - adverse reaction:        20 min Therapeutic Exercise:  [x]  See flow sheet   Rationale:      increase ROM, increase strength, improve coordination, improve balance and increase proprioception to improve the patients ability to perform unlimited ADLs. Billed With/As:   [x] TE   [] TA   [] Neuro   [] Self Care Patient Education: [x] Review HEP    [] Progressed/Changed HEP based on:   [] positioning   [] body mechanics   [] transfers   [] heat/ice application    [] other:      Other Objective/Functional Measures:    FOTO 46     Post Treatment Pain Level (on 0 to 10) scale:   2 / 10     ASSESSMENT  Assessment/Changes in Function:     See POC     []  See Progress Note/Recertification   Patient will continue to benefit from skilled PT services to modify and progress therapeutic interventions, address functional mobility deficits, address ROM deficits, address strength deficits, analyze and address soft tissue restrictions, analyze and cue movement patterns, analyze and modify body mechanics/ergonomics and assess and modify postural abnormalities to attain remaining goals. Progress toward goals / Updated goals:    See POC for new short and long term goals.       PLAN  [x]  Upgrade activities as tolerated YES Continue plan of care   []  Discharge due to :    []  Other:      Therapist: Kathy Rome DPT    Date: 7/14/2022 Time: 1:47 PM     Future Appointments   Date Time Provider Silvano Estrada   7/18/2022  9:00 AM Ru Chaidez PT [facility]   7/21/2022 12:45 PM Ru Chaidez PT [facility]   7/25/2022  3:45 PM Ru Chaidez PT [facility]   7/28/2022 11:15 AM [facility]   8/1/2022 11:00 AM Vida Habermann, PT [facility]   8/4/2022 11:15 AM Ru Chaidez PT [facility]   8/8/2022 11:00 AM Vida Habermann, PT [facility]   8/11/2022 11:15 AM [facility] Steel[facility]

## 2022-07-14 NOTE — THERAPY EVALUATION
[address] [place] [address] PHYSICAL THERAPY AT [address] [place] [address] Tomy Plass 80, [address] [place] [address], [address] [facility] [address]  Phone: [phone]  Fax: [phone] / STATEMENT OF MEDICAL NECESSITY FOR PHYSICAL THERAPY SERVICES  Patient Name: Felecia Ku : 1950   Medical   Diagnosis: Pain in right knee [M25.561] Treatment Diagnosis: S/p R TKA    Onset Date: DOS 2022     Referral Source: Hernan Pires MD Start of Care [facility]): 2022   Prior Hospitalization: See medical history Provider #: [id number]   Prior Level of Function: Riding bike 4x/week; walking dogs daily   Comorbidities: High blood pressure, arthritis   Medications: Verified on Patient Summary List   The Plan of Care and following information is based on the information from the initial evaluation.   ===========================================================================================  Assessment / key information:  Patient is a 70 y.o. female who presents to [facility] s/p R TKA on 2022. Pt reports receiving HHPT for 2 weeks and performing all therex in standing. Pt reports pain ranging from 0-4/10 described as an aching/pressure pain that increases with inc time on feet. She is able to ascend/descend stairs with step-to pattern. She is compliant with RICE principle and reports she stopped wearing compression sleeve earlier this week. Pt reports initially walking with RW and progressed to [facility] one week later. Pt arrives today ambulating without AD lacking TKE with significant R lateral trunk lean. R knee ROM today is 20-88 deg flexion; was able to achieve -14 deg ext after stretching. QS was strong but pt was unable to perform SLR without lag. Patient sings and sx are consistent with pain/discomfort following R TKA. An initial HEP was provided to address above deficits.  Physical Therapy services will be beneficial in order to decrease pain, improve ROM, strength and stability of RLE in order to resume recreational activity and return to PLOF.   ===========================================================================================  Eval Complexity: History MEDIUM  Complexity : 1-2 comorbidities / personal factors will impact the outcome/ POC ;  Examination  MEDIUM Complexity : 3 Standardized tests and measures addressing body structure, function, activity limitation and / or participation in recreation ; Presentation MEDIUM Complexity : Evolving with changing characteristics ; Decision Making MEDIUM Complexity : FOTO score of 26-74; Overall Complexity MEDIUM  Problem List: pain affecting function, decrease ROM, decrease strength, edema affecting function, impaired gait/ balance, decrease ADL/ functional abilitiies, decrease activity tolerance, decrease flexibility/ joint mobility and decrease transfer abilities   Treatment Plan may include any combination of the following: Therapeutic exercise, Therapeutic activities, Neuromuscular re-education, Physical agent/modality, Gait/balance training, Manual therapy, Patient education, Self Care training, Functional mobility training, Home safety training and Stair training  Patient / Family readiness to learn indicated by: asking questions, trying to perform skills and interest  Persons(s) to be included in education: patient (P)  Barriers to Learning/Limitations: None  Measures taken, if barriers to learning:    Patient Goal (s): \"range of motion\"   Patient self reported health status: good  Rehabilitation Potential: good   Short Term Goals: To be accomplished in  3  weeks:  1. Patient will be independent and compliant with initial HEP. 2. Patient will report decreased pain levels to 2/10 in order to sleep through the night pain-free  3. Demonstrate ability to perform 3x10 SLR without lag in order to indicate improved quad control required for ADLS.  Long Term Goals: To be accomplished in  6  weeks:  1.  Patient will be independent and compliant with progressive HEP for R knee stability in order to maintain gains made with physical therapy. 2. Improve FOTO score from 46 to > or = 71 in order to indicate improved ease with ADL's.   3. Demonstrate improved R knee ROM to 0-120 deg in order to resume recreational biking. 4. Demonstrate ability to perform 2x10 6\" lateral step downs with good frontal plane stability in order to improve independence negotiating stairs and curbs. Frequency / Duration:   Patient to be seen  2-3  times per week for 4-6  weeks:  Patient / Caregiver education and instruction: self care, activity modification and exercises  Therapist Signature: Kate Marc DPT Date: 7/81/1189   Certification Period: 7/14/2022-10/11/2022 Time: 10:27 AM   ===========================================================================================  I certify that the above Physical Therapy Services are being furnished while the patient is under my care. I agree with the treatment plan and certify that this therapy is necessary. Physician Signature:        Date:       Time:     Please sign and return to [facility] at [address] [place] [address] or you may fax the signed copy to [phone]. Thank you.     Hari Knutson MD

## 2022-07-18 ENCOUNTER — HOSPITAL ENCOUNTER (OUTPATIENT)
Dept: PHYSICAL THERAPY | Age: 72
Discharge: HOME | End: 2022-07-18
Payer: MEDICARE

## 2022-07-18 PROCEDURE — 97116 GAIT TRAINING THERAPY: CPT | Performed by: PHYSICAL THERAPIST

## 2022-07-18 PROCEDURE — 97110 THERAPEUTIC EXERCISES: CPT | Performed by: PHYSICAL THERAPIST

## 2022-07-18 NOTE — PROGRESS NOTES
PHYSICAL THERAPY - DAILY TREATMENT NOTE    Patient Name: Tila Valentine        Date: 2022  : 1950   YES Patient  Verified  Visit #:   2   of   12  Insurance: Payor: Sony Riccii / Plan: [place] MEDICARE PART A & B / Product Type: Medicare /      In time: 9:00 Out time: 10:00   Total Treatment Time: 55     BCBS/Medicare Time Tracking (below)   Total Timed Codes (min):  45 1:1 Treatment Time:  45     TREATMENT AREA =  Pain in right knee [M25.561]    SUBJECTIVE  Pain Level (on 0 to 10 scale):  3- 10   Medication Changes/New allergies or changes in medical history, any new surgeries or procedures? NO    If yes, update Summary List   Subjective Functional Status/Changes:  []  No changes reported     Pt reports doing a lot of walking over the weekend and had a lot of pain in her R knee. She felt good after her initial session.           Modalities Rationale:     decrease edema, decrease inflammation and decrease pain to improve patient's ability to prevent soreness   min [] Estim, type/location:                                      []  att     []  unatt     []  w/US     []  w/ice    []  w/heat    min []  Mechanical Traction: type/lbs                   []  pro   []  sup   []  int   []  cont    []  before manual    []  after manual    min []  Ultrasound, settings/location:      min []  Iontophoresis w/ dexamethasone, location:                                               []  take home patch       []  in clinic   10 min [x]  Ice     []  Heat    location/position: R knee - supine after session    min []  Vasopneumatic Device, press/temp:     min []  Other:    [] Skin assessment post-treatment (if applicable):    []  intact    []  redness- no adverse reaction     []redness - adverse reaction:        35 min Therapeutic Exercise:  [x]  See flow sheet   Rationale:      increase ROM, increase strength, improve coordination and increase proprioception to improve the patients ability to increase functional activity tolerance         10 min Gait Training:  _200__ feet with __SPC -> no device_ device on level surfaces with __S_ level of assistance   Rationale: To improve ambulation safety and efficiency in order to improve patient's ability to safely ambulate at home for self care. Billed With/As:   [] TE   [] TA   [] Neuro   [] Self Care Patient Education: [x] Review HEP    [] Progressed/Changed HEP based on:   [] positioning   [] body mechanics   [] transfers   [] heat/ice application    [] other:      Other Objective/Functional Measures:    Achieved 97 degrees flexion, and ~ -5 degrees from full extension today. Added mini squat, TKE w band (issued red for home use), and SLS     Post Treatment Pain Level (on 0 to 10) scale:   2 / 10     ASSESSMENT  Assessment/Changes in Function:     Pt was able to walk with near normal mechanics with [facility] today. Encouraged increase frequency using cold packs to manage swelling/symptoms. []  See Progress Note/Recertification   Patient will continue to benefit from skilled PT services to modify and progress therapeutic interventions, address functional mobility deficits, address ROM deficits, address strength deficits, analyze and address soft tissue restrictions, analyze and cue movement patterns, analyze and modify body mechanics/ergonomics, assess and modify postural abnormalities and address imbalance/dizziness to attain remaining goals. Progress toward goals / Updated goals:    Making good progress with regaining PROM of R knee.      PLAN  [x]  Upgrade activities as tolerated YES Continue plan of care   []  Discharge due to :    []  Other:      Therapist: Zoe Galindo, PT    Date: 7/18/2022 Time: 8:51 AM     Future Appointments   Date Time Provider Silvano Estrada   7/18/2022  9:00 AM Genaro Kolb, PT [facility]   7/21/2022 12:45 PM Genaro Kolb, PT [facility]   7/25/2022  3:45 PM Genaro Kolb, PT [facility]   7/28/2022 11:15 AM Megan Steel, PT [facility]   8/1/2022 11:00 AM Stanton Alanis, PT [facility]   8/4/2022 11:15 AM Bebeto Michael, PT [facility]   8/8/2022 11:00 AM Stanton Alanis, PT [facility]   8/11/2022 11:15 AM Britt Steel Speaker, PT [facility]

## 2022-07-21 ENCOUNTER — HOSPITAL ENCOUNTER (OUTPATIENT)
Dept: PHYSICAL THERAPY | Age: 72
Discharge: HOME | End: 2022-07-21
Payer: MEDICARE

## 2022-07-21 PROCEDURE — 97110 THERAPEUTIC EXERCISES: CPT | Performed by: PHYSICAL THERAPIST

## 2022-07-21 PROCEDURE — 97530 THERAPEUTIC ACTIVITIES: CPT | Performed by: PHYSICAL THERAPIST

## 2022-07-21 NOTE — PROGRESS NOTES
PHYSICAL THERAPY - DAILY TREATMENT NOTE    Patient Name: Susan Raya        Date: 2022  : 1950   YES Patient  Verified  Visit #:   3   of   12  Insurance: Payor: Goldy Ring / Plan: [place] MEDICARE PART A & B / Product Type: Medicare /      In time: 12:45 Out time: 1:45   Total Treatment Time: 55     BCBS/Medicare Time Tracking (below)   Total Timed Codes (min):  45 1:1 Treatment Time:  45     TREATMENT AREA =  Pain in right knee [M25.561]    SUBJECTIVE  Pain Level (on 0 to 10 scale):  0  / 10   Medication Changes/New allergies or changes in medical history, any new surgeries or procedures? NO    If yes, update Summary List   Subjective Functional Status/Changes:  []  No changes reported     Pt reports carrying SPC with her, but not always using it. He knee gets sore after doing a lot of activity, but overall doing better.           Modalities Rationale:     decrease edema, decrease inflammation and decrease pain to improve patient's ability to prevent soreness   min [] Estim, type/location:                                      []  att     []  unatt     []  w/US     []  w/ice    []  w/heat    min []  Mechanical Traction: type/lbs                   []  pro   []  sup   []  int   []  cont    []  before manual    []  after manual    min []  Ultrasound, settings/location:      min []  Iontophoresis w/ dexamethasone, location:                                               []  take home patch       []  in clinic   10 min [x]  Ice     []  Heat    location/position: R knee - supine after session    min []  Vasopneumatic Device, press/temp:     min []  Other:    [] Skin assessment post-treatment (if applicable):    []  intact    []  redness- no adverse reaction     []redness - adverse reaction:        35 min Therapeutic Exercise:  [x]  See flow sheet   Rationale:      increase ROM, increase strength, improve coordination and increase proprioception to improve the patients ability to increase functional activity tolerance         10 min Gait Training:  _200__ feet with __SPC -> no device_ device on level surfaces with __S_ level of assistance   Rationale: To improve ambulation safety and efficiency in order to improve patient's ability to safely ambulate at home for self care. Billed With/As:   [] TE   [] TA   [] Neuro   [] Self Care Patient Education: [x] Review HEP    [] Progressed/Changed HEP based on:   [] positioning   [] body mechanics   [] transfers   [] heat/ice application    [] other:      Other Objective/Functional Measures: Added 4 inch step ups today w R LE    Achieved 100° fleixon by end nisreen thakkar    Educated pt in scar mobilization     Post Treatment Pain Level (on 0 to 10) scale:   1  / 10     ASSESSMENT  Assessment/Changes in Function:     Pt showing improved ability to achieve near full extension and she was able to perform strong quad set/SLR without lag. Pt required use of // bars and repeated VCs to perform step ups correctly. []  See Progress Note/Recertification   Patient will continue to benefit from skilled PT services to modify and progress therapeutic interventions, address functional mobility deficits, address ROM deficits, address strength deficits, analyze and address soft tissue restrictions, analyze and cue movement patterns, analyze and modify body mechanics/ergonomics, assess and modify postural abnormalities and address imbalance/dizziness to attain remaining goals. Progress toward goals / Updated goals:    Gradual improvements in strength while minimizing symptoms.      PLAN  [x]  Upgrade activities as tolerated YES Continue plan of care   []  Discharge due to :    []  Other:      Therapist: Thierno Patel PT    Date: 7/21/2022 Time: 8:51 AM     Future Appointments   Date Time Provider Silvano Estrada   7/21/2022 12:45 PM Dhaval Wayne [facility]   7/25/2022  3:45 PM Carlos Reynolds PT [facility]   7/28/2022 11:15 AM Carlos Reynolds PT [facility]   8/1/2022 11:00 AM Hermelinda Landin, PT [facility]   8/4/2022 11:15 AM Carol Ann Cano, PT [facility]   8/8/2022 11:00 AM Hermelinda Landin, PT [facility]   8/11/2022 11:15 AM [facility] Steel[facility]

## 2022-07-25 ENCOUNTER — HOSPITAL ENCOUNTER (OUTPATIENT)
Dept: PHYSICAL THERAPY | Age: 72
Discharge: HOME | End: 2022-07-25
Payer: MEDICARE

## 2022-07-25 PROCEDURE — 97110 THERAPEUTIC EXERCISES: CPT | Performed by: PHYSICAL THERAPIST

## 2022-07-25 PROCEDURE — 97530 THERAPEUTIC ACTIVITIES: CPT | Performed by: PHYSICAL THERAPIST

## 2022-07-25 NOTE — PROGRESS NOTES
PHYSICAL THERAPY - DAILY TREATMENT NOTE    Patient Name: Aron Reading        Date: 2022  : 1950   YES Patient  Verified  Visit #:      12  Insurance: Payor: Tyler Morel / Plan: [place] MEDICARE PART A & B / Product Type: Medicare /      In time: 3:50 Out time: 4:45   Total Treatment Time: 55     BCBS/Medicare Time Tracking (below)   Total Timed Codes (min):  45 1:1 Treatment Time:  45     TREATMENT AREA =  Pain in right knee [M25.561]    SUBJECTIVE  Pain Level (on 0 to 10 scale):  0  / 10   Medication Changes/New allergies or changes in medical history, any new surgeries or procedures? NO    If yes, update Summary List   Subjective Functional Status/Changes:  []  No changes reported     Pt presents today without SPC. She went to the mall with her granddaughter, and her knee started to hurt after walking long distances.           Modalities Rationale:     decrease edema, decrease inflammation and decrease pain to improve patient's ability to prevent soreness   min [] Estim, type/location:                                      []  att     []  unatt     []  w/US     []  w/ice    []  w/heat    min []  Mechanical Traction: type/lbs                   []  pro   []  sup   []  int   []  cont    []  before manual    []  after manual    min []  Ultrasound, settings/location:      min []  Iontophoresis w/ dexamethasone, location:                                               []  take home patch       []  in clinic   10 min [x]  Ice     []  Heat    location/position: R knee - supine after session    min []  Vasopneumatic Device, press/temp:     min []  Other:    [] Skin assessment post-treatment (if applicable):    []  intact    []  redness- no adverse reaction     []redness - adverse reaction:        35 min Therapeutic Exercise:  [x]  See flow sheet   Rationale:      increase ROM, increase strength, improve coordination and increase proprioception to improve the patients ability to increase functional activity tolerance         10 min Therapeutic Exercise:  [x]  See flow sheet   Rationale:      increase ROM, increase strength, improve coordination and increase proprioception to improve the patients ability to increase functional activity tolerance         Billed With/As:   [] TE   [] TA   [] Neuro   [] Self Care Patient Education: [x] Review HEP    [] Progressed/Changed HEP based on:   [] positioning   [] body mechanics   [] transfers   [] heat/ice application    [] other:      Other Objective/Functional Measures: Added Nu Step today, and increased height of step to 6 inches by end of session    Achieved 100° fleixon [facility]         Post Treatment Pain Level (on 0 to 10) scale:   0 / 10     ASSESSMENT  Assessment/Changes in Function:     Pt not having any pain during therex, but needed repeated VCs to get step technique correct. She had some discomfort with bending, but achieved 100°. []  See Progress Note/Recertification   Patient will continue to benefit from skilled PT services to modify and progress therapeutic interventions, address functional mobility deficits, address ROM deficits, address strength deficits, analyze and address soft tissue restrictions, analyze and cue movement patterns, analyze and modify body mechanics/ergonomics, assess and modify postural abnormalities and address imbalance/dizziness to attain remaining goals. Progress toward goals / Updated goals:    Making slow progress with regaining ROM, but good tolerance to strengthening while managing pain well.      PLAN  [x]  Upgrade activities as tolerated YES Continue plan of care   []  Discharge due to :    []  Other:      Therapist: Zoe Galindo, PT    Date: 7/25/2022 Time: 8:51 AM     Future Appointments   Date Time Provider Silvano Estrada   7/25/2022  3:45 PM Jazmín Ardon [facility]   7/28/2022 11:15 AM Genaro Kolb, PT [facility]   8/1/2022 11:00 AM Paulo Mcclain, PT [facility]   8/4/2022 11:15 AM Damián [facility]e [facility]   8/8/2022 11:00 AM Cole Otto, PT [facility]   8/11/2022 11:15 AM Kaye Steel, PT [facility]

## 2022-07-28 ENCOUNTER — APPOINTMENT (OUTPATIENT)
Dept: PHYSICAL THERAPY | Age: 72
End: 2022-07-28
Payer: MEDICARE

## 2022-07-29 ENCOUNTER — HOSPITAL ENCOUNTER (OUTPATIENT)
Dept: PHYSICAL THERAPY | Age: 72
Discharge: HOME | End: 2022-07-29
Payer: MEDICARE

## 2022-07-29 PROCEDURE — 97116 GAIT TRAINING THERAPY: CPT | Performed by: PHYSICAL THERAPIST

## 2022-07-29 PROCEDURE — 97110 THERAPEUTIC EXERCISES: CPT | Performed by: PHYSICAL THERAPIST

## 2022-07-29 NOTE — PROGRESS NOTES
PHYSICAL THERAPY - DAILY TREATMENT NOTE    Patient Name: Buddy Epstein        Date: 2022  : 1950   YES Patient  Verified  Visit #:     Insurance: Payor: Ammon Parson / Plan: [place] MEDICARE PART A & B / Product Type: Medicare /      In time: 11:55 Out time: 12:50   Total Treatment Time: 55     BCBS/Medicare Time Tracking (below)   Total Timed Codes (min):  45 1:1 Treatment Time:  45     TREATMENT AREA =  Pain in right knee [M25.561]    SUBJECTIVE  Pain Level (on 0 to 10 scale):  0  / 10   Medication Changes/New allergies or changes in medical history, any new surgeries or procedures? NO    If yes, update Summary List   Subjective Functional Status/Changes:  []  No changes reported     Pt reports walking w SPC, but not always put it on the ground.           Modalities Rationale:     decrease edema, decrease inflammation and decrease pain to improve patient's ability to prevent soreness   min [] Estim, type/location:                                      []  att     []  unatt     []  w/US     []  w/ice    []  w/heat    min []  Mechanical Traction: type/lbs                   []  pro   []  sup   []  int   []  cont    []  before manual    []  after manual    min []  Ultrasound, settings/location:      min []  Iontophoresis w/ dexamethasone, location:                                               []  take home patch       []  in clinic   10 min [x]  Ice     []  Heat    location/position: R knee - supine after session    min []  Vasopneumatic Device, press/temp:     min []  Other:    [] Skin assessment post-treatment (if applicable):    []  intact    []  redness- no adverse reaction     []redness - adverse reaction:        30 min Therapeutic Exercise:  [x]  See flow sheet   Rationale:      increase ROM, increase strength, improve coordination and increase proprioception to improve the patients ability to increase functional activity tolerance         5 min Therapeutic Activity:  [x]  See flow sheet Rationale:      increase ROM, increase strength, improve coordination and increase proprioception to improve the patients ability to increase functional activity tolerance     10 min Gait traininft without AD, focus on TKE at initial contact w S   Rationale:      increase ROM, increase strength, improve coordination and increase proprioception to improve the patients ability to increase functional activity tolerance     Billed With/As:   [] TE   [] TA   [] Neuro   [] Self Care Patient Education: [x] Review HEP    [] Progressed/Changed HEP based on:   [] positioning   [] body mechanics   [] transfers   [] heat/ice application    [] other:      Other Objective/Functional Measures:    Increased height of step to 6 inches for both sets today    Added retro walk on TM to help achieve full TKE at initial contact         Post Treatment Pain Level (on 0 to 10) scale:   0 / 10     ASSESSMENT  Assessment/Changes in Function:     Pt showing improved gait mechanics with practice     []  See Progress Note/Recertification   Patient will continue to benefit from skilled PT services to modify and progress therapeutic interventions, address functional mobility deficits, address ROM deficits, address strength deficits, analyze and address soft tissue restrictions, analyze and cue movement patterns, analyze and modify body mechanics/ergonomics, assess and modify postural abnormalities and address imbalance/dizziness to attain remaining goals.    Progress toward goals / Updated goals:    Making good progress with correct gait mechanics     PLAN  [x]  Upgrade activities as tolerated YES Continue plan of care   []  Discharge due to :    []  Other:      Therapist: Cecily Wesley PT    Date: 2022 Time: 8:51 AM     Future Appointments   Date Time Provider Silvano Estrada   2022 12:00 PM Woodie Gunning [facility]   2022 11:00 AM Jami Weller PT [facility]   2022 11:15 AM [facility] 8/8/2022 11:00 AM Maria Guadalupe Steinberg PT [facility]   8/11/2022 11:15 AM Lorenzo Steel, [facility]

## 2022-08-01 ENCOUNTER — HOSPITAL ENCOUNTER (OUTPATIENT)
Dept: PHYSICAL THERAPY | Age: 72
End: 2022-08-01
Payer: MEDICARE

## 2022-08-02 ENCOUNTER — HOSPITAL ENCOUNTER (OUTPATIENT)
Dept: PHYSICAL THERAPY | Age: 72
Discharge: HOME | End: 2022-08-02
Payer: MEDICARE

## 2022-08-02 PROCEDURE — 97530 THERAPEUTIC ACTIVITIES: CPT

## 2022-08-02 PROCEDURE — 97116 GAIT TRAINING THERAPY: CPT

## 2022-08-02 PROCEDURE — 97110 THERAPEUTIC EXERCISES: CPT

## 2022-08-02 NOTE — PROGRESS NOTES
PHYSICAL THERAPY - DAILY TREATMENT NOTE    Patient Name: Rudi Tiwari        Date: 2022  : 1950   YES Patient  Verified  Visit #:      12  Insurance: Payor: Eve Vo / Plan: [place] MEDICARE PART A & B / Product Type: Medicare /      In time: 8:00 A Out time: 9:10 A   Total Treatment Time: 65     BCBS/Medicare Time Tracking (below)   Total Timed Codes (min):  55 1:1 Treatment Time:  45     TREATMENT AREA =  Pain in right knee [M25.561]    SUBJECTIVE  Pain Level (on 0 to 10 scale):  0  / 10   Medication Changes/New allergies or changes in medical history, any new surgeries or procedures? NO    If yes, update Summary List   Subjective Functional Status/Changes:  []  No changes reported     Patient reports she walks with her [facility] when she goes out for long distances, she went to the mall last week. But she feels comfortable with walking around the house without her cane.           Modalities Rationale:     decrease edema, decrease inflammation, and decrease pain to improve patient's ability to return to pain-free ADLs    min [] Estim, type/location:                                      []  att     []  unatt     []  w/US     []  w/ice    []  w/heat    min []  Mechanical Traction: type/lbs                   []  pro   []  sup   []  int   []  cont    []  before manual    []  after manual    min []  Ultrasound, settings/location:      min []  Iontophoresis w/ dexamethasone, location:                                               []  take home patch       []  in clinic   10 min [x]  Ice     []  Heat    location/position: Supine to R knee     min []  Vasopneumatic Device, press/temp:    If using vaso (only need to measure limb vaso being performed on)      pre-treatment girth :       post-treatment girth :       measured at (landmark location) :      min []  Other:    [] Skin assessment post-treatment (if applicable):    [x]  intact    [x]  redness- no adverse reaction                  []redness - adverse reaction:        30/  20 min Therapeutic Exercise:  [x]  See flow sheet   Rationale:      increase ROM and increase strength to improve the patients ability to turn to pain-free sitting for prolonged periods of sitting     15 min Therapeutic Activity: [x]  See flow sheet   Rationale:    increase ROM, increase strength, improve balance, and increase proprioception to improve the patients ability to return to stair negotiation      10 min Gait Training:  __100_ feet without SPC_ device on level surfaces with __supervision_ level of assistance   Rationale: To improve ambulation safety and efficiency in order to improve patient's ability to safely ambulate at home for self care. Billed With/As:   [] TE   [] TA   [] Neuro   [] Self Care Patient Education: [x] Review HEP    [] Progressed/Changed HEP based on:   [] positioning   [] body mechanics   [] transfers   [] heat/ice application    [] other:      Other Objective/Functional Measures:    Good tolerance to today's treatment no increase in pain     Post Treatment Pain Level (on 0 to 10) scale:   0  / 10     ASSESSMENT  Assessment/Changes in Function:     V/c to avoid hip hike with step ups & v/c & visual cues with mirror to emphasize TKE on R with ambulation in clinic      []  See Progress Note/Recertification   Patient will continue to benefit from skilled PT services to modify and progress therapeutic interventions, address functional mobility deficits, address ROM deficits, address strength deficits, analyze and address soft tissue restrictions, analyze and cue movement patterns, analyze and modify body mechanics/ergonomics, and instruct in home and community integration to attain remaining goals.    Progress toward goals / Updated goals:    Progressing towards STG 3 & STG 2 met     PLAN  []  Upgrade activities as tolerated YES Continue plan of care   []  Discharge due to :    []  Other:      Therapist: Sheila Hooper, PT    Date: 8/2/2022 Time: 9:03 AM     Future Appointments   Date Time Provider Silvano Estrada   8/4/2022 11:15 AM [facility]   8/8/2022 11:00 AM Charity Mayers PT [facility]   8/11/2022 11:15 AM Nicole Steel, PT [facility]

## 2022-08-04 ENCOUNTER — HOSPITAL ENCOUNTER (OUTPATIENT)
Dept: PHYSICAL THERAPY | Age: 72
Discharge: HOME | End: 2022-08-04
Payer: MEDICARE

## 2022-08-04 PROCEDURE — 97110 THERAPEUTIC EXERCISES: CPT | Performed by: PHYSICAL THERAPIST

## 2022-08-04 PROCEDURE — 97530 THERAPEUTIC ACTIVITIES: CPT | Performed by: PHYSICAL THERAPIST

## 2022-08-04 NOTE — PROGRESS NOTES
PHYSICAL THERAPY - DAILY TREATMENT NOTE    Patient Name: Yesenia Arreguin        Date: 2022  : 1950   YES Patient  Verified  Visit #:     Insurance: Payor: Rivas Racer / Plan: [place] MEDICARE PART A & B / Product Type: Medicare /      In time: 11:15 Out time: 12:15   Total Treatment Time: 55     BCBS/Medicare Time Tracking (below)   Total Timed Codes (min):  45 1:1 Treatment Time:  45     TREATMENT AREA =  Pain in right knee [M25.561]    SUBJECTIVE  Pain Level (on 0 to 10 scale):  0  / 10   Medication Changes/New allergies or changes in medical history, any new surgeries or procedures? NO    If yes, update Summary List   Subjective Functional Status/Changes:  []  No changes reported     Pt reports able to sleep better. She is still taking her [address] when leaving home, but not using in her house.           Modalities Rationale:     decrease edema, decrease inflammation and decrease pain to improve patient's ability to prevent soreness   min [] Estim, type/location:                                      []  att     []  unatt     []  w/US     []  w/ice    []  w/heat    min []  Mechanical Traction: type/lbs                   []  pro   []  sup   []  int   []  cont    []  before manual    []  after manual    min []  Ultrasound, settings/location:      min []  Iontophoresis w/ dexamethasone, location:                                               []  take home patch       []  in clinic   10 min [x]  Ice     []  Heat    location/position: R knee - supine after session    min []  Vasopneumatic Device, press/temp:     min []  Other:    [] Skin assessment post-treatment (if applicable):    []  intact    []  redness- no adverse reaction     []redness - adverse reaction:        35 min Therapeutic Exercise:  [x]  See flow sheet   Rationale:      increase ROM, increase strength, improve coordination and increase proprioception to improve the patients ability to increase functional activity tolerance         10 min Therapeutic Activity:  [x]  See flow sheet   Rationale:      increase ROM, increase strength, improve coordination and increase proprioception to improve the patients ability to increase functional activity tolerance         Billed With/As:   [] TE   [] TA   [] Neuro   [] Self Care Patient Education: [x] Review HEP    [] Progressed/Changed HEP based on:   [] positioning   [] body mechanics   [] transfers   [] heat/ice application    [] other:      Other Objective/Functional Measures: Added 4 inch lateral steps today    Increased band to green for TKE         Post Treatment Pain Level (on 0 to 10) scale:   0 / 10     ASSESSMENT  Assessment/Changes in Function:     Pt tolerated all new progression well. She needed VCs to perform squatting correctly. []  See Progress Note/Recertification   Patient will continue to benefit from skilled PT services to modify and progress therapeutic interventions, address functional mobility deficits, address ROM deficits, address strength deficits, analyze and address soft tissue restrictions, analyze and cue movement patterns, analyze and modify body mechanics/ergonomics, assess and modify postural abnormalities and address imbalance/dizziness to attain remaining goals. Progress toward goals / Updated goals:    Extension ROM strength improving well, but flexion ROM progressing more slowly.        PLAN  [x]  Upgrade activities as tolerated YES Continue plan of care   []  Discharge due to :    []  Other:      Therapist: Clora Buerger, PT    Date: 8/4/2022 Time: 8:51 AM     Future Appointments   Date Time Provider Silvano Estrada   8/4/2022 11:15 AM Modesta Cordova, PT [facility]   8/8/2022 11:00 AM Randy Powers, PT [facility]   8/11/2022 11:15 AM [facility]

## 2022-08-08 ENCOUNTER — HOSPITAL ENCOUNTER (OUTPATIENT)
Dept: PHYSICAL THERAPY | Age: 72
Discharge: HOME | End: 2022-08-08
Payer: MEDICARE

## 2022-08-08 PROCEDURE — 97530 THERAPEUTIC ACTIVITIES: CPT

## 2022-08-08 PROCEDURE — 97110 THERAPEUTIC EXERCISES: CPT

## 2022-08-08 NOTE — PROGRESS NOTES
PHYSICAL THERAPY - DAILY TREATMENT NOTE    Patient Name: Susan Raya        Date: 2022  : 1950   YES Patient  Verified  Visit #:     Insurance: Payor: Goldy iRng / Plan: [place] MEDICARE PART A & B / Product Type: Medicare /      In time: 990 Out time:    Total Treatment Time: 55     BCBS/Medicare Time Tracking (below)   Total Timed Codes (min):  45 1:1 Treatment Time:  40     TREATMENT AREA =  Pain in right knee [M25.561]    SUBJECTIVE  Pain Level (on 0 to 10 scale):  0  / 10   Medication Changes/New allergies or changes in medical history, any new surgeries or procedures? NO    If yes, update Summary List   Subjective Functional Status/Changes:  []  No changes reported     I am still very stiff in the mornings and after sitting for long periods sitting. Modalities Rationale:     decrease edema, decrease inflammation, and decrease pain to improve patient's ability to perform pain-free ADLs.     min [] Estim, type/location:                                      []  att     []  unatt     []  w/US     []  w/ice    []  w/heat    min []  Mechanical Traction: type/lbs                   []  pro   []  sup   []  int   []  cont    []  before manual    []  after manual    min []  Ultrasound, settings/location:      min []  Iontophoresis w/ dexamethasone, location:                                               []  take home patch       []  in clinic   10 min [x]  Ice     []  Heat    location/position: R knee supine    min []  Vasopneumatic Device, press/temp:    If using vaso (only need to measure limb vaso being performed on)      pre-treatment girth :       post-treatment girth :       measured at (landmark location) :      min []  Other:    [x] Skin assessment post-treatment (if applicable):    [x]  intact    [x]  redness- no adverse reaction                  []redness - adverse reaction:        35/  30 min Therapeutic Exercise:  [x]  See flow sheet   Rationale:      increase ROM, increase strength, improve coordination, and increase proprioception to improve the patients ability to perform unlimited ADLs. 10 min Therapeutic Activity: [x]  See flow sheet   Rationale:    increase ROM, increase strength, improve coordination, and increase proprioception to improve the patients ability to negotiate stairs and curbs without pain    Billed With/As:   [x] TE   [] TA   [] Neuro   [] Self Care Patient Education: [x] Review HEP    [] Progressed/Changed HEP based on:   [] positioning   [] body mechanics   [] transfers   [] heat/ice application    [] other:      Other Objective/Functional Measures:    Flexion  deg      Post Treatment Pain Level (on 0 to 10) scale:   0  / 10     ASSESSMENT  Assessment/Changes in Function:     Remains limited in both flex and ext motion; was able to achieve small heel lift with QS after extra towel was added. Cues required during squats to improve flexion ROM,     []  See Progress Note/Recertification   Patient will continue to benefit from skilled PT services to modify and progress therapeutic interventions, address functional mobility deficits, address ROM deficits, address strength deficits, analyze and address soft tissue restrictions, analyze and cue movement patterns, analyze and modify body mechanics/ergonomics, and assess and modify postural abnormalities to attain remaining goals. Progress toward goals / Updated goals:    Progressing towards LTG 3.       PLAN  [x]  Upgrade activities as tolerated YES Continue plan of care   []  Discharge due to :    []  Other:      Therapist: Abby Soulier, DPT    Date: 8/8/2022 Time: 11:32 AM     Future Appointments   Date Time Provider Silvano Estrada   8/11/2022 11:15 AM Morgan Steel, PT [facility]

## 2022-08-11 ENCOUNTER — HOSPITAL ENCOUNTER (OUTPATIENT)
Dept: PHYSICAL THERAPY | Age: 72
Discharge: HOME | End: 2022-08-11
Payer: MEDICARE

## 2022-08-11 PROCEDURE — 97530 THERAPEUTIC ACTIVITIES: CPT | Performed by: PHYSICAL THERAPIST

## 2022-08-11 PROCEDURE — 97110 THERAPEUTIC EXERCISES: CPT | Performed by: PHYSICAL THERAPIST

## 2022-08-11 NOTE — PROGRESS NOTES
PHYSICAL THERAPY - DAILY TREATMENT NOTE    Patient Name: Carmen Falk        Date: 2022  : 1950   YES Patient  Verified  Visit #:     Insurance: Payor: Mahendra Parkinson / Plan: [place] MEDICARE PART A & B / Product Type: Medicare /      In time: 11:15 Out time: 12:15   Total Treatment Time: 55     BCBS/Medicare Time Tracking (below)   Total Timed Codes (min):  45 1:1 Treatment Time:  45     TREATMENT AREA =  Pain in right knee [M25.561]    SUBJECTIVE  Pain Level (on 0 to 10 scale):  0  / 10   Medication Changes/New allergies or changes in medical history, any new surgeries or procedures? NO    If yes, update Summary List   Subjective Functional Status/Changes:  []  No changes reported     Pt reports trying to bend her knee more at home, and she has had more discomfort - but nothing consistent.           Modalities Rationale:     decrease edema, decrease inflammation and decrease pain to improve patient's ability to prevent soreness   min [] Estim, type/location:                                      []  att     []  unatt     []  w/US     []  w/ice    []  w/heat    min []  Mechanical Traction: type/lbs                   []  pro   []  sup   []  int   []  cont    []  before manual    []  after manual    min []  Ultrasound, settings/location:      min []  Iontophoresis w/ dexamethasone, location:                                               []  take home patch       []  in clinic   10 min [x]  Ice     []  Heat    location/position: R knee - supine after session    min []  Vasopneumatic Device, press/temp:     min []  Other:    [] Skin assessment post-treatment (if applicable):    []  intact    []  redness- no adverse reaction     []redness - adverse reaction:        35 min Therapeutic Exercise:  [x]  See flow sheet   Rationale:      increase ROM, increase strength, improve coordination and increase proprioception to improve the patients ability to increase functional activity tolerance         10 min Therapeutic Activity:  [x]  See flow sheet   Rationale:      increase ROM, increase strength, improve coordination and increase proprioception to improve the patients ability to increase functional activity tolerance         Billed With/As:   [] TE   [] TA   [] Neuro   [] Self Care Patient Education: [x] Review HEP    [] Progressed/Changed HEP based on:   [] positioning   [] body mechanics   [] transfers   [] heat/ice application    [] other:      Other Objective/Functional Measures: Added recumbent bike today    Able to bend to 105° today by end of session         Post Treatment Pain Level (on 0 to 10) scale:   0 / 10     ASSESSMENT  Assessment/Changes in Function:     Pt showing improved R knee extension, especially with ambulation (no longer using SPC). She was encouraged to keep up with flexing her knee several times/day at home. Able to get full revolution on bike with some compensatory hip hiking. []  See Progress Note/Recertification   Patient will continue to benefit from skilled PT services to modify and progress therapeutic interventions, address functional mobility deficits, address ROM deficits, address strength deficits, analyze and address soft tissue restrictions, analyze and cue movement patterns, analyze and modify body mechanics/ergonomics, assess and modify postural abnormalities and address imbalance/dizziness to attain remaining goals. Progress toward goals / Updated goals:    Pt showing gradual improvements in flexion ROM and overall functional strength of R knee.        PLAN  [x]  Upgrade activities as tolerated YES Continue plan of care   []  Discharge due to :    []  Other:      Therapist: Cecily Wesley, PT    Date: 8/11/2022 Time: 8:51 AM     Future Appointments   Date Time Provider Silvano Estrada   8/11/2022 11:15 AM Juanito Steel, PT [facility]

## 2022-08-15 ENCOUNTER — HOSPITAL ENCOUNTER (OUTPATIENT)
Dept: PHYSICAL THERAPY | Age: 72
Discharge: HOME | End: 2022-08-15
Payer: MEDICARE

## 2022-08-15 PROCEDURE — 97110 THERAPEUTIC EXERCISES: CPT

## 2022-08-15 PROCEDURE — 97530 THERAPEUTIC ACTIVITIES: CPT

## 2022-08-15 PROCEDURE — 97112 NEUROMUSCULAR REEDUCATION: CPT

## 2022-08-15 NOTE — PROGRESS NOTES
PHYSICAL THERAPY - DAILY TREATMENT NOTE     Patient Name: Andie Castillo        Date: 8/15/2022  : 1950   YES Patient  Verified  Visit #:   10   of   12  Insurance: Payor: Arielle Members / Plan: [place] MEDICARE PART A & B / Product Type: Medicare /      In time: 1140 Out time: 8946   Total Treatment Time: 55     Medicare/BCBS Time Tracking (below)   Total Timed Codes (min):  40 1:1 Treatment Time:  40     TREATMENT AREA =  Pain in right knee [M25.561]    SUBJECTIVE    Pain Level (on 0 to 10 scale):  0-1  / 10   Medication Changes/New allergies or changes in medical history, any new surgeries or procedures?     NO    If yes, update Summary List   Subjective Functional Status/Changes:  []  No changes reported       Functional improvements: \"I can't seem to bend it\"  Functional impairments: Decreased ROm and strength, increased pain affecting gait and ADL's         OBJECTIVE  Modalities Rationale:     decrease edema, decrease inflammation, and decrease pain to improve patient's ability to perform ADL's w/o pain      min [] Estim, type/location:                                      []  att     []  unatt     []  w/US     []  w/ice    []  w/heat    min []  Mechanical Traction: type/lbs                   []  pro   []  sup   []  int   []  cont    []  before manual    []  after manual    min []  Ultrasound, settings/location:      min []  Iontophoresis w/ dexamethasone, location:                                               []  take home patch       []  in clinic   10 min [x]  Ice     []  Heat    location/position: Supine to R knee    min []  Vasopneumatic Device, press/temp:  If using vaso (only need to measure limb vaso being performed on)      pre-treatment girth :       post-treatment girth :       measured at (landmark location) :       min []  Other:    [x] Skin assessment post-treatment (if applicable):    [x]  intact    [x]  redness- no adverse reaction     []redness - adverse reaction:      10 min Therapeutic Exercise:  [x]  See flow sheet   Rationale:      increase ROM and increase strength to improve the patients ability to ambulate w/ normal gait     25/20 min Therapeutic Activity: [x]  See flow sheet   Rationale:      increase strength and improve coordination to improve the patients ability to perform sit to stand transfers w/ equal weightbearing. 10 min Neuromuscular Re-ed: [x]  See flow sheet   Rationale:      increase strength, improve coordination, improve balance, and increase proprioception to improve the patients ability to ambulate w/ normal gait     Billed With/As:   [x] TE   [x] TA   [x] Neuro   [] Self Care Patient Education: [x] Review HEP    [] Progressed/Changed HEP based on:   [] positioning   [] body mechanics   [] transfers   [] heat/ice application    [] other:        Other Objective/Functional Measures:    Held exercises per flow sheet secondary to patient being 10 min late  AAROM 0-105     Post Treatment Pain Level (on 0 to 10) scale:   0  / 10     ASSESSMENT    Assessment/Changes in Function:     See Progress Report     [x]  See Progress Note/Recertification   Patient will continue to benefit from skilled PT services to modify and progress therapeutic interventions, address functional mobility deficits, address ROM deficits, address strength deficits, analyze and address soft tissue restrictions, analyze and cue movement patterns, analyze and modify body mechanics/ergonomics, and assess and modify postural abnormalities to attain remaining goals.       Progress toward goals / Updated goals:    See Progress Report     PLAN    [x]  Upgrade activities as tolerated YES Continue plan of care   []  Discharge due to :    []  Other:      Therapist: Vin Miranda PT    Date: 8/15/2022 Time: 8:16 AM     Future Appointments   Date Time Provider Silvano Estrada   8/15/2022 11:30 AM Elin Godinez PT [facility]   8/18/2022 12:15 PM Elin Godinez PT [facility]   8/23/2022 10:15 AM Aminah Baker [facility] [place]   8/26/2022 11:45 AM Benajmin Hancock PT [facility]

## 2022-08-15 NOTE — PROGRESS NOTES
[address] [facility] PHYSICAL THERAPY  [facility]  Jed Tomy Plass [address] [address], [facility], [facility], [address] -   Phone: [phone]  Fax: [phone] OF CARE/RECERTIFICATION FOR PHYSICAL THERAPY          Patient Name: Rosalva Schafer : 1950   Treatment/Medical Diagnosis: Pain in right knee [M25.561]   Onset Date: 22    Referral Source: Leif Licona MD Start of Care [facility]): 22   Prior Hospitalization: See Medical History Provider #: [id number]   Prior Level of Function: Riding bike 4x/week/daily walks with dog   Comorbidities: HTN Arthritis   Medications: Verified on Patient Summary List   Visits from [facility]: 10 Missed Visits: 0     SUMMARY OF TREATMENT  Instruction in HEP and gait training, therapeutic exercise, manual therapy, therapeutic activities, modalities to include cryotherapy  CURRENT STATUS  Ms. Herrera has made slow steady progress in physical therapy consistently reporting decreased pain and increased strength. She continues to have difficulty with flexion progression despite working on ROM at home. Her current AAROM is 0-105 deg. She continues to ambulate with decreased stance time on right and verbal cues for TKE. Previous Goals  Patient will be independent and compliant with initial HEP. Patient will report decreased pain levels to 2/10 in order to sleep through the night pain-free  Demonstrate ability to perform 3x10 SLR without lag in order to indicate improved quad control required for ADLS. Prior Level/Current Level:  1) Prior Level: N/A   Current Level:  Independent with current HEP   Goal Met? yes  2) Prior Level: 4/10   Current Level: 0-2/10   Goal Met? yes  3) Prior Level: unable   Current Level: 3x10 SLR w/o extensor lag   Goal Met? yes    Problem List: pain affecting function, decrease ROM, decrease strength, edema affecting function, impaired gait/ balance, decrease ADL/ functional abilitiies, decrease activity tolerance, and decrease flexibility/ joint mobility   Treatment Plan may include any combination of the following: Therapeutic exercise, Therapeutic activities, Neuromuscular re-education, Physical agent/modality, Gait/balance training, Manual therapy, Patient education, Self Care training, and Functional mobility training  Patient Goal(s) has been updated and includes:     Goals for this certification period include and are to be achieved in   4-6  weeks:  Patient will be independent and compliant with progressive HEP for R knee stability in order to maintain gains made with physical therapy. Improve FOTO score from 46 to > or = 71 in order to indicate improved ease with ADL's. Demonstrate improved R knee ROM to 0-120 deg in order to resume recreational biking. Demonstrate ability to perform 2x10 6\" lateral step downs with good frontal plane stability in order to improve independence negotiating stairs and curbs. Frequency / Duration:   Patient to be seen   2-3   times per week for   4-6    weeks:    Assessments/Recommendations: Continue PT 2-3x/wk for an additional 4-6 weeks to allow patient to achieve all LTG's  If you have any questions/comments please contact us directly at [phone]. Thank you for allowing us to assist in the care of your patient.     Therapist Signature: Tiki Baldwin PT Date: 2/07/4207   Certification Period:  Reporting Period: 7/14/22 to 10/11/22  7/14/22 to 8/15/22 Time: 8:17 AM   NOTE TO PHYSICIAN:  PLEASE COMPLETE THE ORDERS BELOW AND FAX TO   [facility] Physical Therapy: [phone]  If you are unable to process this request in 24 hours please contact our office: [phone]    ___ I have read the above report and request that my patient continue as recommended.   ___ I have read the above report and request that my patient continue therapy with the following changes/special instructions:_________________________________________________________   ___ I have read the above report and request that my patient be discharged from therapy.      Physician Signature:        Date:            Cassie Dunham MD  Time:

## 2022-08-18 ENCOUNTER — APPOINTMENT (OUTPATIENT)
Dept: PHYSICAL THERAPY | Age: 72
End: 2022-08-18
Payer: MEDICARE

## 2022-08-19 ENCOUNTER — HOSPITAL ENCOUNTER (OUTPATIENT)
Dept: PHYSICAL THERAPY | Age: 72
Discharge: HOME | End: 2022-08-19
Payer: MEDICARE

## 2022-08-19 PROCEDURE — 97530 THERAPEUTIC ACTIVITIES: CPT

## 2022-08-19 PROCEDURE — 97110 THERAPEUTIC EXERCISES: CPT

## 2022-08-19 NOTE — PROGRESS NOTES
PHYSICAL THERAPY - DAILY TREATMENT NOTE    Patient Name: Hanna Guerrero        Date: 2022  : 1950   YES Patient  Verified  Visit #:     Insurance: Payor: Dewayne Marga / Plan: [place] MED[place] PART A & B / Product Type: Medicare /      In time: 1:15 P Out time: 2:05 P   Total Treatment Time: 50     BCBS/Medicare Time Tracking (below)   Total Timed Codes (min):  40 1:1 Treatment Time:  40     TREATMENT AREA =  Pain in right knee [M25.561]  SUBJECTIVE  Pain Level (on 0 to 10 scale):  0-1  / 10   Medication Changes/New allergies or changes in medical history, any new surgeries or procedures? NO    If yes, update Summary List   Subjective Functional Status/Changes:  []  No changes reported     Patient reports she feels like her bend is still tight she she stretches on her back but she can sit easier with a bend in her knee.             Modalities Rationale:     decrease edema, decrease inflammation, and decrease pain to improve patient's ability to return to pain-free ADLs    min [] Estim, type/location:                                      []  att     []  unatt     []  w/US     []  w/ice    []  w/heat    min []  Mechanical Traction: type/lbs                   []  pro   []  sup   []  int   []  cont    []  before manual    []  after manual    min []  Ultrasound, settings/location:      min []  Iontophoresis w/ dexamethasone, location:                                               []  take home patch       []  in clinic   10 min [x]  Ice     []  Heat    location/position: Supine to R knee     min []  Vasopneumatic Device, press/temp:    If using vaso (only need to measure limb vaso being performed on)      pre-treatment girth :       post-treatment girth :       measured at (landmark location) :      min []  Other:    [] Skin assessment post-treatment (if applicable):    [x]  intact    [x]  redness- no adverse reaction                  []redness - adverse reaction:        25 min Therapeutic Exercise:  [x]  See flow sheet   Rationale:      increase ROM and increase strength to improve the patients ability to return to prolonged sitting with manageable symptoms     15 min Therapeutic Activity: [x]  See flow sheet   Rationale:    increase ROM, increase strength, and increase proprioception to improve the patients ability to return to pain-free sit to stand transfers      Billed With/As:   [] TE   [] TA   [] Neuro   [] Self Care Patient Education: [x] Review HEP    [] Progressed/Changed HEP based on:   [] positioning   [] body mechanics   [] transfers   [] heat/ice application    [] other:      Other Objective/Functional Measures:    Flexion: 105 degrees (supine AAROM with belt)     Post Treatment Pain Level (on 0 to 10) scale:   0  / 10     ASSESSMENT  Assessment/Changes in Function:     Good tolerance to today's treatment, tolerance to flexion based stretches slowly improving     []  See Progress Note/Recertification   Patient will continue to benefit from skilled PT services to modify and progress therapeutic interventions, address functional mobility deficits, address ROM deficits, address strength deficits, analyze and address soft tissue restrictions, analyze and cue movement patterns, analyze and modify body mechanics/ergonomics, assess and modify postural abnormalities, and instruct in home and community integration to attain remaining goals.    Progress toward goals / Updated goals:    Progressing towards newly established LTGs     PLAN  []  Upgrade activities as tolerated YES Continue plan of care   []  Discharge due to :    []  Other:      Therapist: Rufino Mayo PT    Date: 8/19/2022 Time: 2:38 PM     Future Appointments   Date Time Provider Silvano Esrtada   8/23/2022 10:15 AM Moe Martinez PT [facility]   8/26/2022 11:45 AM Moe Martinez PT [facility]

## 2022-08-23 ENCOUNTER — APPOINTMENT (OUTPATIENT)
Dept: PHYSICAL THERAPY | Age: 72
End: 2022-08-23
Payer: MEDICARE

## 2022-08-23 ENCOUNTER — HOSPITAL ENCOUNTER (OUTPATIENT)
Dept: PHYSICAL THERAPY | Age: 72
Discharge: HOME | End: 2022-08-23
Payer: MEDICARE

## 2022-08-23 PROCEDURE — 97140 MANUAL THERAPY 1/> REGIONS: CPT

## 2022-08-23 PROCEDURE — 97530 THERAPEUTIC ACTIVITIES: CPT

## 2022-08-23 PROCEDURE — 97110 THERAPEUTIC EXERCISES: CPT

## 2022-08-23 NOTE — PROGRESS NOTES
PHYSICAL THERAPY - DAILY TREATMENT NOTE    Patient Name: Christina Burris        Date: 2022  : 1950   YES Patient  Verified  Visit #:     Insurance: Payor: Mari Elliott / Plan: [place] MEDICARE PART A & B / Product Type: Medicare /      In time: 308 Out time: 7908   Total Treatment Time: 60     BCBS/Medicare Time Tracking (below)   Total Timed Codes (min):  50 1:1 Treatment Time:  50     TREATMENT AREA =  Pain in right knee [M25.561]    SUBJECTIVE  Pain Level (on 0 to 10 scale):  0-1  / 10   Medication Changes/New allergies or changes in medical history, any new surgeries or procedures?     NO    If yes, update Summary List   Subjective Functional Status/Changes:  []  No changes reported     \"I really haven't been thinking about myknee because I hurt my arm trying to a weights workout at home\"           Modalities Rationale:     decrease inflammation and decrease pain to improve patient's ability to perform pain free ADLs    min [] Estim, type/location:                                      []  att     []  unatt     []  w/US     []  w/ice    []  w/heat    min []  Mechanical Traction: type/lbs                   []  pro   []  sup   []  int   []  cont    []  before manual    []  after manual    min []  Ultrasound, settings/location:      min []  Iontophoresis w/ dexamethasone, location:                                               []  take home patch       []  in clinic   10 min [x]  Ice     []  Heat    location/position: CP to R knee in supine     min []  Vasopneumatic Device, press/temp:    If using vaso (only need to measure limb vaso being performed on)      pre-treatment girth :       post-treatment girth :       measured at (landmark location) :      min []  Other:    [] Skin assessment post-treatment (if applicable):    []  intact    []  redness- no adverse reaction                  []redness - adverse reaction:        25 min Therapeutic Exercise:  [x]  See flow sheet   Rationale: increase ROM, increase strength, improve coordination, improve balance, and increase proprioception to improve the patients ability to improve ease with ADLs      10 min Manual Therapy: Patellar mobility inferior and superior, scar and skin mobilization with knee in flexion and patient education on self skin mobility at home   Rationale:      decrease pain, increase ROM, and increase tissue extensibility to improve patient's ability to improve flexion range of motion  The manual therapy interventions were performed at a separate and distinct time from the therapeutic activities interventions. 15 min Therapeutic Activity: [x]  See flow sheet   Rationale:    increase ROM, increase strength, improve coordination, improve balance, and increase proprioception to improve the patients ability to improve ease with stairs    Billed With/As:   [] TE   [] TA   [] Neuro   [] Self Care Patient Education: [x] Review HEP    [] Progressed/Changed HEP based on:   [] positioning   [] body mechanics   [] transfers   [] heat/ice application    [] other:      Other Objective/Functional Measures:    Progressed SLS to green disc  Patient able to perform retro revolution on bike today x 5  Flexion AAROM 108 degrees with end range discomfort      Post Treatment Pain Level (on 0 to 10) scale:   0  / 10     ASSESSMENT  Assessment/Changes in Function:     Patient required cues throughout standing exercise to prevent compensatory movement patterns during all exercises involving flexion of the R knee.   Good progresss with flexion AROM without loss of extension     []  See Progress Note/Recertification   Patient will continue to benefit from skilled PT services to modify and progress therapeutic interventions, address functional mobility deficits, address ROM deficits, address strength deficits, analyze and address soft tissue restrictions, analyze and cue movement patterns, analyze and modify body mechanics/ergonomics, assess and modify postural abnormalities, address imbalance/dizziness, and instruct in home and community integration to attain remaining goals. Progress toward goals / Updated goals:    Progressing towards LTG 2.      PLAN  []  Upgrade activities as tolerated YES Continue plan of care   []  Discharge due to :    []  Other:      Therapist: Aury Kincaid    Date: 8/23/2022 Time: 1:12 PM     Future Appointments   Date Time Provider Silvano Estrada   8/26/2022 11:45 AM Mar Kim, PT [facility]

## 2022-08-26 ENCOUNTER — APPOINTMENT (OUTPATIENT)
Dept: PHYSICAL THERAPY | Age: 72
End: 2022-08-26
Payer: MEDICARE

## 2022-08-31 ENCOUNTER — HOSPITAL ENCOUNTER (OUTPATIENT)
Dept: PHYSICAL THERAPY | Age: 72
Discharge: HOME | End: 2022-08-31
Payer: MEDICARE

## 2022-08-31 PROCEDURE — 97140 MANUAL THERAPY 1/> REGIONS: CPT | Performed by: PHYSICAL THERAPIST

## 2022-08-31 PROCEDURE — 97110 THERAPEUTIC EXERCISES: CPT | Performed by: PHYSICAL THERAPIST

## 2022-08-31 NOTE — PROGRESS NOTES
PHYSICAL THERAPY - DAILY TREATMENT NOTE    Patient Name: Hanna Guerrero        Date: 2022  : 1950   YES Patient  Verified  Visit #:   15  of   20  Insurance: Payor: Dewayne Marga / Plan: [place] MEDICARE PART A & B / Product Type: Medicare /      In time: 3:45 Out time: 4:45   Total Treatment Time: 55     BCBS/Medicare Time Tracking (below)   Total Timed Codes (min):  45 1:1 Treatment Time:  45     TREATMENT AREA =  Pain in right knee [M25.561]    SUBJECTIVE  Pain Level (on 0 to 10 scale):  0  / 10   Medication Changes/New allergies or changes in medical history, any new surgeries or procedures? YES    If yes, update Summary List   Subjective Functional Status/Changes:  []  No changes reported     Pt reports seeing ortho and was put on prednisone for 5 days. He encouraged her increase her knee flexion ROM, because if she doesn't get it now - she'll never get it.           Modalities Rationale:     decrease edema, decrease inflammation and decrease pain to improve patient's ability to prevent soreness   min [] Estim, type/location:                                      []  att     []  unatt     []  w/US     []  w/ice    []  w/heat    min []  Mechanical Traction: type/lbs                   []  pro   []  sup   []  int   []  cont    []  before manual    []  after manual    min []  Ultrasound, settings/location:      min []  Iontophoresis w/ dexamethasone, location:                                               []  take home patch       []  in clinic   10 min [x]  Ice     []  Heat    location/position: R knee - supine after session    min []  Vasopneumatic Device, press/temp:     min []  Other:    [] Skin assessment post-treatment (if applicable):    []  intact    []  redness- no adverse reaction     []redness - adverse reaction:        35 min Therapeutic Exercise:  [x]  See flow sheet   Rationale:      increase ROM, increase strength, improve coordination and increase proprioception to improve the patients ability to increase functional activity tolerance          min Therapeutic Activity:  []  See flow sheet   Rationale:      increase ROM, increase strength, improve coordination and increase proprioception to improve the patients ability to increase functional activity tolerance       10 min Manual Therapy:  Patellar and scar mobilization in neutral and scar mobilization at ~90° of flexion in supine before flexion biased stretches   Rationale:      increase ROM, increase strength, improve coordination and increase proprioception to improve the patients ability to increase functional activity tolerance     Billed With/As:   [] TE   [] TA   [] Neuro   [] Self Care Patient Education: [x] Review HEP    [] Progressed/Changed HEP based on:   [] positioning   [] body mechanics   [] transfers   [] heat/ice application    [] other:      Other Objective/Functional Measures:    Achieved 110° flexion by end of session today    Pt encouraged to focus on relaxing during flexion stretches on NuStep and heel slides with strap         Post Treatment Pain Level (on 0 to 10) scale:   0 / 10     ASSESSMENT  Assessment/Changes in Function:     Pt is showing good strength for transfers, but lacks the flexion for ease with car/tub transfers. []  See Progress Note/Recertification   Patient will continue to benefit from skilled PT services to modify and progress therapeutic interventions, address functional mobility deficits, address ROM deficits, address strength deficits, analyze and address soft tissue restrictions, analyze and cue movement patterns, analyze and modify body mechanics/ergonomics, assess and modify postural abnormalities and address imbalance/dizziness to attain remaining goals. Progress toward goals / Updated goals:    Making gradual progress with regaining flexion ROM.        PLAN  [x]  Upgrade activities as tolerated YES Continue plan of care   []  Discharge due to :    []  Other:      Therapist: Etienne Leija, PT Date: 8/31/2022 Time: 8:51 AM     Future Appointments   Date Time Provider Silvano Estrada   8/31/2022  3:45 PM Hu Greathouse [facility]   9/2/2022  1:15 PM Cat Robles, PT [facility]   9/7/2022  3:00 PM Zaria Escalante, PT [facility]   9/9/2022  2:45 PM Cat Robles, PT [facility]   9/14/2022 12:45 PM Zaria Escalante, PT [facility]   9/16/2022 11:45 AM Cat Robles, PT [facility]   9/21/2022 11:00 AM Cat Robles, PT [facility]   9/23/2022 11:45 AM Gaurav Atwood Si, PT [facility]

## 2022-09-02 ENCOUNTER — HOSPITAL ENCOUNTER (OUTPATIENT)
Dept: PHYSICAL THERAPY | Age: 72
Discharge: HOME | End: 2022-09-02
Payer: MEDICARE

## 2022-09-02 PROCEDURE — 97530 THERAPEUTIC ACTIVITIES: CPT

## 2022-09-02 PROCEDURE — 97110 THERAPEUTIC EXERCISES: CPT

## 2022-09-02 PROCEDURE — 97140 MANUAL THERAPY 1/> REGIONS: CPT

## 2022-09-02 NOTE — PROGRESS NOTES
PHYSICAL THERAPY - DAILY TREATMENT NOTE    Patient Name: Rudi Tiwari        Date: 2022  : 1950   YES Patient  Verified  Visit #:     Insurance: Payor: Eve Vo / Plan: [place] MEDICARE PART A & B / Product Type: Medicare /      In time: 115 Out time: 215   Total Treatment Time: 55     BCBS/Medicare Time Tracking (below)   Total Timed Codes (min):  45 1:1 Treatment Time:  40     TREATMENT AREA =  Pain in right knee [M25.561]    SUBJECTIVE  Pain Level (on 0 to 10 scale):  0-1  / 10   Medication Changes/New allergies or changes in medical history, any new surgeries or procedures? NO    If yes, update Summary List   Subjective Functional Status/Changes:  []  No changes reported     The prednisone is helping with pain, still stiff when I walk too far          Modalities Rationale:     decrease edema, decrease inflammation, and decrease pain to improve patient's ability to perform pain-free ADLs.     min [] Estim, type/location:                                      []  att     []  unatt     []  w/US     []  w/ice    []  w/heat    min []  Mechanical Traction: type/lbs                   []  pro   []  sup   []  int   []  cont    []  before manual    []  after manual    min []  Ultrasound, settings/location:      min []  Iontophoresis w/ dexamethasone, location:                                               []  take home patch       []  in clinic   10 min []  Ice     []  Heat    location/position:     min []  Vasopneumatic Device, press/temp:    If using vaso (only need to measure limb vaso being performed on)      pre-treatment girth :       post-treatment girth :       measured at (landmark location) :      min []  Other:    [x] Skin assessment post-treatment (if applicable):    [x]  intact    [x]  redness- no adverse reaction                  []redness - adverse reaction:        20  /15                                                                                                       min Therapeutic Exercise:  [x]  See flow sheet   Rationale:      increase ROM, increase strength, improve coordination, and increase proprioception to improve the patients ability to perform unlimited ADLs. 10 min Manual Therapy: Scar mobilization and posterior tibial translation in varying deg of flexion    Rationale:      decrease pain, increase ROM, increase tissue extensibility, and decrease edema  to improve patient's ability to sit without pain  The manual therapy interventions were performed at a separate and distinct time from the therapeutic activities interventions. 15 min Therapeutic Activity: [x]  See flow sheet   Rationale:    increase ROM, increase strength, improve coordination, and increase proprioception to improve the patients ability to negotiate stairs and curbs without pain. Billed With/As:   [x] TE   [] TA   [] Neuro   [] Self Care Patient Education: [x] Review HEP    [] Progressed/Changed HEP based on:   [] positioning   [] body mechanics   [] transfers   [] heat/ice application    [] other:      Other Objective/Functional Measures: Added forward step down on 4\" box    116 deg flexion with heel slide     Post Treatment Pain Level (on 0 to 10) scale:   0  / 10     ASSESSMENT  Assessment/Changes in Function:     Good tolerance to progression of exercises although requires UE support for stair training. Taught pt self scar mobilization in seated to improve flexion range as instructed by MD.      []  See Progress Note/Recertification   Patient will continue to benefit from skilled PT services to modify and progress therapeutic interventions, address functional mobility deficits, address ROM deficits, address strength deficits, analyze and address soft tissue restrictions, analyze and cue movement patterns, analyze and modify body mechanics/ergonomics, and assess and modify postural abnormalities to attain remaining goals.    Progress toward goals / Updated goals:    Progressing towards LTG 3      PLAN  [x]  Upgrade activities as tolerated YES Continue plan of care   []  Discharge due to :    []  Other:      Therapist: Carol Borrego DPT    Date: 9/2/2022 Time: 1:25 PM     Future Appointments   Date Time Provider Silvano Estrada   9/7/2022  3:00 PM Abraham Israel [facility]   9/9/2022  2:45 PM Princess Martinez, PT [facility]   9/14/2022 12:45 PM Anya Felix, PT [facility]   9/16/2022 11:45 AM Princess Martinez, PT [facility]   9/21/2022 11:00 AM Princess Martinez, PT [facility]   9/23/2022 11:45 AM Karla Atwood, PT [facility]

## 2022-09-07 ENCOUNTER — HOSPITAL ENCOUNTER (OUTPATIENT)
Dept: PHYSICAL THERAPY | Age: 72
Discharge: HOME | End: 2022-09-07
Payer: MEDICARE

## 2022-09-07 PROCEDURE — 97110 THERAPEUTIC EXERCISES: CPT | Performed by: PHYSICAL THERAPIST

## 2022-09-07 PROCEDURE — 97530 THERAPEUTIC ACTIVITIES: CPT | Performed by: PHYSICAL THERAPIST

## 2022-09-07 NOTE — PROGRESS NOTES
PHYSICAL THERAPY - DAILY TREATMENT NOTE    Patient Name: Rommel Murrell        Date: 2022  : 1950   YES Patient  Verified  Visit #:   15  of   20  Insurance: Payor: Fallon Sargent / Plan: [place] & [place] / Product Type: Medicare /      In time: 2:45 Out time: 3:45   Total Treatment Time: 55     BCBS/Medicare Time Tracking (below)   Total Timed Codes (min):  45 1:1 Treatment Time:  45     TREATMENT AREA =  Pain in right knee [M25.561]    SUBJECTIVE  Pain Level (on 0 to 10 scale):  0  / 10   Medication Changes/New allergies or changes in medical history, any new surgeries or procedures? YES    If yes, update Summary List   Subjective Functional Status/Changes:  []  No changes reported     Pt reports feeling stronger with walking etc.  Overall, having minimal pain and she has been trying to descend stairs with reciprocal steps more lately and it is feeling better.           Modalities Rationale:     decrease edema, decrease inflammation and decrease pain to improve patient's ability to prevent soreness   min [] Estim, type/location:                                      []  att     []  unatt     []  w/US     []  w/ice    []  w/heat    min []  Mechanical Traction: type/lbs                   []  pro   []  sup   []  int   []  cont    []  before manual    []  after manual    min []  Ultrasound, settings/location:      min []  Iontophoresis w/ dexamethasone, location:                                               []  take home patch       []  in clinic   10 min [x]  Ice     []  Heat    location/position: R knee - supine after session    min []  Vasopneumatic Device, press/temp:     min []  Other:    [] Skin assessment post-treatment (if applicable):    []  intact    []  redness- no adverse reaction     []redness - adverse reaction:        30 min Therapeutic Exercise:  [x]  See flow sheet   Rationale:      increase ROM, increase strength, improve coordination and increase proprioception to improve the patients ability to increase functional activity tolerance         10 min Therapeutic Activity:  [x]  See flow sheet   Rationale:      increase ROM, increase strength, improve coordination and increase proprioception to improve the patients ability to increase functional activity tolerance       5 min Manual Therapy:  Patellar and scar mobilization in neutral and scar mobilization at ~90° of flexion in supine before flexion biased stretches   Rationale:      increase ROM, increase strength, improve coordination and increase proprioception to improve the patients ability to increase functional activity tolerance     Billed With/As:   [] TE   [] TA   [] Neuro   [] Self Care Patient Education: [x] Review HEP    [] Progressed/Changed HEP based on:   [] positioning   [] body mechanics   [] transfers   [] heat/ice application    [] other:      Other Objective/Functional Measures:    Achieved 116° flexion by end of session today    Increased height of step over to 4 inches -> 6 inches today         Post Treatment Pain Level (on 0 to 10) scale:   0 / 10     ASSESSMENT  Assessment/Changes in Function:     Pt was able to get around on bike more easily today. []  See Progress Note/Recertification   Patient will continue to benefit from skilled PT services to modify and progress therapeutic interventions, address functional mobility deficits, address ROM deficits, address strength deficits, analyze and address soft tissue restrictions, analyze and cue movement patterns, analyze and modify body mechanics/ergonomics, assess and modify postural abnormalities and address imbalance/dizziness to attain remaining goals.    Progress toward goals / Updated goals:    Pt is maintaining gains in flexion ROM       PLAN  [x]  Upgrade activities as tolerated YES Continue plan of care   []  Discharge due to :    []  Other:      Therapist: Luisa Mccarthy PT    Date: 9/7/2022 Time: 8:51 AM     Future Appointments   Date Time Provider Silvano Estrada   9/7/2022  3:00 PM [facility] [place] [facility]   9/9/2022  2:45 PM Gerri Batres, PT [facility]   9/14/2022 12:45 PM Parker An, PT [facility]   9/16/2022 11:45 AM Gerri Batres, PT [facility]   9/21/2022 11:00 AM Gerri Batres, PT [facility]   9/23/2022 11:45 AM Stanley Atwood, PT [facility]

## 2022-09-09 ENCOUNTER — HOSPITAL ENCOUNTER (OUTPATIENT)
Dept: PHYSICAL THERAPY | Age: 72
Discharge: HOME | End: 2022-09-09
Payer: MEDICARE

## 2022-09-09 PROCEDURE — 97110 THERAPEUTIC EXERCISES: CPT

## 2022-09-09 PROCEDURE — 97530 THERAPEUTIC ACTIVITIES: CPT

## 2022-09-09 NOTE — PROGRESS NOTES
PHYSICAL THERAPY - DAILY TREATMENT NOTE    Patient Name: Will Cortez        Date: 2022  : 1950   YES Patient  Verified  Visit #:     Insurance: Payor: Jazmín Arrietaors / Plan: [place] MEDICARE PART A & B / Product Type: Medicare /      In time: 245 Out time: 340   Total Treatment Time: 55     BCBS/Medicare Time Tracking (below)   Total Timed Codes (min):  45 1:1 Treatment Time:  45     TREATMENT AREA =  Pain in right knee [M25.561]    SUBJECTIVE  Pain Level (on 0 to 10 scale):  0 / 10   Medication Changes/New allergies or changes in medical history, any new surgeries or procedures? NO    If yes, update Summary List   Subjective Functional Status/Changes:  []  No changes reported     Pt reports she is able to descend 3-4 stairs before returning to step to pattern         Modalities Rationale:     decrease edema, decrease inflammation, and decrease pain to improve patient's ability to perform pain-free ADLs.     min [] Estim, type/location:                                      []  att     []  unatt     []  w/US     []  w/ice    []  w/heat    min []  Mechanical Traction: type/lbs                   []  pro   []  sup   []  int   []  cont    []  before manual    []  after manual    min []  Ultrasound, settings/location:      min []  Iontophoresis w/ dexamethasone, location:                                               []  take home patch       []  in clinic   10 min []  Ice     []  Heat    location/position:     min []  Vasopneumatic Device, press/temp:    If using vaso (only need to measure limb vaso being performed on)      pre-treatment girth :       post-treatment girth :       measured at (landmark location) :      min []  Other:    [x] Skin assessment post-treatment (if applicable):    [x]  intact    [x]  redness- no adverse reaction                  []redness - adverse reaction:        25 min Therapeutic Exercise:  [x]  See flow sheet   Rationale:      increase ROM, increase strength, improve coordination, and increase proprioception to improve the patients ability to perform unlimited ADLs.      5/NB min Manual Therapy: Scar mobilization during stair flexion stretch   Rationale:      decrease pain, increase ROM, increase tissue extensibility, and decrease edema  to improve patient's ability to sit without pain  The manual therapy interventions were performed at a separate and distinct time from the therapeutic activities interventions. 15 min Therapeutic Activity: [x]  See flow sheet   Rationale:    increase ROM, increase strength, improve coordination, and increase proprioception to improve the patients ability to negotiate stairs and curbs without pain. Billed With/As:   [x] TE   [] TA   [] Neuro   [] Self Care Patient Education: [x] Review HEP    [] Progressed/Changed HEP based on:   [] positioning   [] body mechanics   [] transfers   [] heat/ice application    [] other:      Other Objective/Functional Measures: Added stair flexion stretch   Post Treatment Pain Level (on 0 to 10) scale:   0  / 10     ASSESSMENT  Assessment/Changes in Function:     Pt had improved ease with stair training today and minimal discomfort on bike following stair flexion stretch. []  See Progress Note/Recertification   Patient will continue to benefit from skilled PT services to modify and progress therapeutic interventions, address functional mobility deficits, address ROM deficits, address strength deficits, analyze and address soft tissue restrictions, analyze and cue movement patterns, analyze and modify body mechanics/ergonomics, and assess and modify postural abnormalities to attain remaining goals.    Progress toward goals / Updated goals:    Progressing towards LTG 3      PLAN  [x]  Upgrade activities as tolerated YES Continue plan of care   []  Discharge due to :    []  Other:      Therapist: Cheyanne Lozoya, MADDY    Date: 9/9/2022 Time: 1:25 PM     Future Appointments   Date Time Provider Silvano Estrada   9/14/2022 12:45 PM Ethyl Riser [facility]   9/16/2022 11:45 AM Minoo Rogers, PT [facility]   9/21/2022 11:00 AM Minoo Rogers, PT [facility]   9/23/2022 11:45 AM Pia Atwood, PT [facility]

## 2022-09-14 ENCOUNTER — HOSPITAL ENCOUNTER (OUTPATIENT)
Dept: PHYSICAL THERAPY | Age: 72
Discharge: HOME | End: 2022-09-14
Payer: MEDICARE

## 2022-09-14 PROCEDURE — 97110 THERAPEUTIC EXERCISES: CPT | Performed by: PHYSICAL THERAPIST

## 2022-09-14 PROCEDURE — 97530 THERAPEUTIC ACTIVITIES: CPT | Performed by: PHYSICAL THERAPIST

## 2022-09-14 NOTE — PROGRESS NOTES
PHYSICAL THERAPY - DAILY TREATMENT NOTE    Patient Name: Rudi Tiwari        Date: 2022  : 1950   YES Patient  Verified  Visit #:     Insurance: Payor: [place] MEDICARE / Plan: [place] MEDICARE PART A & B / Product Type: Medicare /      In time: 12:50 Out time: 1:40   Total Treatment Time: 50     BCBS/Medicare Time Tracking (below)   Total Timed Codes (min):  40 1:1 Treatment Time:  40     TREATMENT AREA =  Pain in right knee [M25.561]    SUBJECTIVE  Pain Level (on 0 to 10 scale):  0  / 10   Medication Changes/New allergies or changes in medical history, any new surgeries or procedures? YES    If yes, update Summary List   Subjective Functional Status/Changes:  []  No changes reported     Pt report being invited to bowl next week, but she is not sure it is a good idea.           Modalities Rationale:     decrease edema, decrease inflammation and decrease pain to improve patient's ability to prevent soreness   min [] Estim, type/location:                                      []  att     []  unatt     []  w/US     []  w/ice    []  w/heat    min []  Mechanical Traction: type/lbs                   []  pro   []  sup   []  int   []  cont    []  before manual    []  after manual    min []  Ultrasound, settings/location:      min []  Iontophoresis w/ dexamethasone, location:                                               []  take home patch       []  in clinic   10 min [x]  Ice     []  Heat    location/position: R knee - supine after session    min []  Vasopneumatic Device, press/temp:     min []  Other:    [] Skin assessment post-treatment (if applicable):    []  intact    []  redness- no adverse reaction     []redness - adverse reaction:        25 min Therapeutic Exercise:  [x]  See flow sheet   Rationale:      increase ROM, increase strength, improve coordination and increase proprioception to improve the patients ability to increase functional activity tolerance         10 min Therapeutic Activity: Stepping up mechanics and pt performed simulated bowling motion without any adverse stress onto her R knee. Rationale:      increase ROM, increase strength, improve coordination and increase proprioception to improve the patients ability to increase functional activity tolerance       5 min Manual Therapy:  Patellar and scar mobilization in neutral and scar mobilization at ~90° of flexion in supine before flexion biased stretches   Rationale:      increase ROM, increase strength, improve coordination and increase proprioception to improve the patients ability to increase functional activity tolerance     Billed With/As:   [] TE   [] TA   [] Neuro   [] Self Care Patient Education: [x] Review HEP    [] Progressed/Changed HEP based on:   [] positioning   [] body mechanics   [] transfers   [] heat/ice application    [] other:      Other Objective/Functional Measures:    Achieved 117° flexion by end of session today    Pt had much improved tolerance to stepping over a 6 inch step today. She had some mild discomfort with bending knee maximally when L foot landed, but symptoms quickly resolved. Post Treatment Pain Level (on 0 to 10) scale:   0 / 10     ASSESSMENT  Assessment/Changes in Function:     Pt still only having knee pain when maximally bending it. She was encouraged to keep up with bending and she will plan to trial bowling next week if symptoms remain reduced. []  See Progress Note/Recertification   Patient will continue to benefit from skilled PT services to modify and progress therapeutic interventions, address functional mobility deficits, address ROM deficits, address strength deficits, analyze and address soft tissue restrictions, analyze and cue movement patterns, analyze and modify body mechanics/ergonomics, assess and modify postural abnormalities and address imbalance/dizziness to attain remaining goals.    Progress toward goals / Updated goals:    Pt is maintaining her flexion motion from session to session,a nd strength remains good.        PLAN  [x]  Upgrade activities as tolerated YES Continue plan of care   []  Discharge due to :    []  Other:      Therapist: Ryann Houston PT    Date: 9/14/2022 Time: 8:51 AM     Future Appointments   Date Time Provider Silvano Estrada   9/14/2022 12:45 PM Meng Lux, PT [facility]   9/16/2022 11:45 AM Ashley Cho, PT [facility]   9/21/2022 11:00 AM Ashley Cho, PT [facility]   9/23/2022 11:45 AM Roly Atwood, PT [facility]

## 2022-09-16 ENCOUNTER — HOSPITAL ENCOUNTER (OUTPATIENT)
Dept: PHYSICAL THERAPY | Age: 72
End: 2022-09-16
Payer: MEDICARE

## 2022-09-21 ENCOUNTER — HOSPITAL ENCOUNTER (OUTPATIENT)
Dept: PHYSICAL THERAPY | Age: 72
Discharge: HOME | End: 2022-09-21
Payer: MEDICARE

## 2022-09-21 PROCEDURE — 97530 THERAPEUTIC ACTIVITIES: CPT

## 2022-09-21 PROCEDURE — 97110 THERAPEUTIC EXERCISES: CPT

## 2022-09-21 NOTE — PROGRESS NOTES
[address] [place] [address] PHYSICAL THERAPY AT [address] [place] [address] [facility], [address] [place] [address], [address]  Phone: [phone]  Fax: [phone]  PROGRESS NOTE  Patient Name: Tila Valentine : 1950   Treatment/Medical Diagnosis: Pain in right knee [M25.561]   Referral Source: Dayanna Banks MD     Date of Initial Visit: 2022 Attended Visits: 18 Missed Visits: 3     SUMMARY OF TREATMENT  ***  CURRENT STATUS  ***    Goal/Measure of Progress Goal Met? 1. Patient will be independent and compliant with progressive HEP for R knee stability in order to maintain gains made with physical therapy. Status at last Eval: *** Current Status: Compliant daily {Yes/No-Ex:466352}   2. Improve FOTO score from 46 to > or = 71 in order to indicate improved ease with ADL's. Status at last Eval: *** Current Status: TBA {Yes/No-Ex:211128}   3. Demonstrate improved R knee ROM to 0-120 deg in order to resume recreational biking. Status at last Eval: 0-105 Current Status: 0-119 deg Progressing   4. Demonstrate ability to perform 2x10 6\" lateral step downs with good frontal plane stability in order to improve independence negotiating stairs and curbs. Status at last Eval: *** Current Status: Good stability with UE support required {Yes/No-Ex:719720}     New Goals to be achieved in __***__  {[facility] OP WEEKS/TREATMENTS:}:  1.  ***   2.  ***   3.  ***   4.  ***       RECOMMENDATIONS  ***  If you have any questions/comments please contact us directly at [phone]. Thank you for allowing us to assist in the care of your patient. Therapist Signature: Ishan Man DPT Date: 2022   Reporting period:  Time: 11:20 AM   NOTE TO PHYSICIAN:  PLEASE COMPLETE THE ORDERS BELOW AND FAX TO   [facility]: [phone].   If you are unable to process this request in 24 hours please contact our office: [phone].    ___ I have read the above report and request that my patient continue as recommended.   ___ I have read the above report and request that my patient continue therapy with the following changes/special instructions:_________________________________________________________   ___ I have read the above report and request that my patient be discharged from therapy. I certify that the above Physical Therapy Services are being furnished while the patient is under my care. I agree with the treatment plan and certify that this therapy is necessary.   Physician Signature:_______________________________________________  Date:____________________________     Time: _______________________________________  Ramana Sears MD

## 2022-09-21 NOTE — PROGRESS NOTES
PHYSICAL THERAPY - DAILY TREATMENT NOTE    Patient Name: Rosalva Schafer        Date: 2022  : 1950   YES Patient  Verified  Visit #:      20  Insurance: Payor: [facility] [employer] / Plan: [place] MEDICARE PART A & B / Product Type: Medicare /      In time:  Out time:    Total Treatment Time: 50     BCBS/Medicare Time Tracking (below)   Total Timed Codes (min):  45 1:1 Treatment Time:  45     TREATMENT AREA =  Pain in right knee [M25.561]    SUBJECTIVE  Pain Level (on 0 to 10 scale):  0  / 10   Medication Changes/New allergies or changes in medical history, any new surgeries or procedures? NO    If yes, update Summary List   Subjective Functional Status/Changes:  []  No changes reported     Pt reports she was unable to bowl over the weekend due to change of plans; is planning to over the weekend. Modalities Rationale:     decrease edema, decrease inflammation, and decrease pain to improve patient's ability to perform pain-free ADLs.     min [] Estim, type/location:                                      []  att     []  unatt     []  w/US     []  w/ice    []  w/heat    min []  Mechanical Traction: type/lbs                   []  pro   []  sup   []  int   []  cont    []  before manual    []  after manual    min []  Ultrasound, settings/location:      min []  Iontophoresis w/ dexamethasone, location:                                               []  take home patch       []  in clinic   5 min [x]  Ice     []  Heat    location/position: R knee supine    min []  Vasopneumatic Device, press/temp:    If using vaso (only need to measure limb vaso being performed on)      pre-treatment girth :       post-treatment girth :       measured at (landmark location) :      min []  Other:    [x] Skin assessment post-treatment (if applicable):    [x]  intact    [x]  redness- no adverse reaction                  []redness - adverse reaction:        30 min Therapeutic Exercise:  [x]  See flow sheet Rationale:      increase ROM, increase strength, and improve coordination to improve the patients ability to perform unlimited ADLs. 15 min Therapeutic Activity: [x]  See flow sheet  Simulated bowling with 5 and 10 lb KB   Rationale:    increase ROM, increase strength, improve coordination, and increase proprioception to improve the patients ability to resume recreational activity    Billed With/As:   [x] TE   [] TA   [] Neuro   [] Self Care Patient Education: [x] Review HEP    [] Progressed/Changed HEP based on:   [] positioning   [] body mechanics   [] transfers   [] heat/ice application    [] other:      Other Objective/Functional Measures:    0-119 deg flex      Post Treatment Pain Level (on 0 to 10) scale:   0  / 10     ASSESSMENT  Assessment/Changes in Function:     Good tolerance to eccentric lowering with stair training today. Good stability with simulated bowling with inc weight.      []  See Progress Note/Recertification   Patient will continue to benefit from skilled PT services to modify and progress therapeutic interventions, address functional mobility deficits, address ROM deficits, address strength deficits, analyze and address soft tissue restrictions, analyze and cue movement patterns, analyze and modify body mechanics/ergonomics, and assess and modify postural abnormalities to attain remaining goals. Progress toward goals / Updated goals:    Progressing towards LTG 3. PLAN  []  Upgrade activities as tolerated YES Continue plan of care   []  Discharge due to :    [x]  Other: Plan to DC next session if symptoms remain low.       Therapist: Antonio Rodrigues DPT    Date: 9/21/2022 Time: 1:24 PM     Future Appointments   Date Time Provider Silvano Estrada   9/23/2022 11:45 AM Ana Laura Atwood, PT [facility]

## 2022-11-21 ENCOUNTER — TRANSCRIBE ORDER (OUTPATIENT)
Dept: REGISTRATION | Age: 72
End: 2022-11-21

## 2022-11-21 ENCOUNTER — HOSPITAL ENCOUNTER (OUTPATIENT)
Dept: LAB | Age: 72
Discharge: HOME | End: 2022-11-21
Payer: MEDICARE

## 2022-11-21 ENCOUNTER — HOSPITAL ENCOUNTER (OUTPATIENT)
Dept: NON INVASIVE DIAGNOSTICS | Age: 72
Discharge: HOME | End: 2022-11-21
Payer: MEDICARE

## 2022-11-21 DIAGNOSIS — Z01.818 OTHER SPECIFIED PRE-OPERATIVE EXAMINATION: ICD-10-CM

## 2022-11-21 DIAGNOSIS — Z01.818 OTHER SPECIFIED PRE-OPERATIVE EXAMINATION: Primary | ICD-10-CM

## 2022-11-21 LAB
HCT VFR BLD AUTO: 36.4 % (ref 35–45)
HGB BLD-MCNC: 11.6 G/DL (ref 12–16)
POTASSIUM SERPL-SCNC: 4.2 MMOL/L (ref 3.5–5.5)

## 2022-11-21 PROCEDURE — 85018 HEMOGLOBIN: CPT

## 2022-11-21 PROCEDURE — 93005 ELECTROCARDIOGRAM TRACING: CPT

## 2022-11-21 PROCEDURE — 84132 ASSAY OF SERUM POTASSIUM: CPT

## 2022-11-21 PROCEDURE — 36415 COLL VENOUS BLD VENIPUNCTURE: CPT

## 2022-11-22 LAB
ATRIAL RATE: 82 BPM
CALCULATED P AXIS, ECG09: 66 DEGREES
CALCULATED R AXIS, ECG10: 49 DEGREES
CALCULATED T AXIS, ECG11: 37 DEGREES
DIAGNOSIS, 93000: NORMAL
P-R INTERVAL, ECG05: 116 MS
Q-T INTERVAL, ECG07: 386 MS
QRS DURATION, ECG06: 84 MS
QTC CALCULATION (BEZET), ECG08: 450 MS
VENTRICULAR RATE, ECG03: 82 BPM

## 2023-01-03 ENCOUNTER — APPOINTMENT (OUTPATIENT)
Dept: PHYSICAL THERAPY | Age: 73
End: 2023-01-03

## (undated) DEVICE — BLADE SAW W13XL90MM 1.19MM PARA

## (undated) DEVICE — SOLUTION SCRB 4OZ 10% PVP I POVIDONE IOD TOP PAINT EXIDINE

## (undated) DEVICE — THE CANADY HYBRID PLASMA SCALPEL IS AN ELECTROSURGICAL PLASMA SCALPEL THAT USES AN 85MM BENDABLE PADDLE BLADE TIP. THE ELECTROSURGICAL PLASMA SCALPEL IS USED TO SIMULTANEOUSLY CUT AND COAGULATE BIOLOGICAL TISSUE.: Brand: CANADY HYBRID PLASMA PADDLE BLADE

## (undated) DEVICE — SUT VCRL + 1 36IN CT1 VIO --

## (undated) DEVICE — SOL INJ L R 1000ML BG --

## (undated) DEVICE — PIN GUIDE FIX 3.2X62 MM SCREW [GS9030620324P] [KOMET MEDICAL]

## (undated) DEVICE — DRSG MEPILES BORDER AG 4X12 -- 5/BX

## (undated) DEVICE — SOL IRRIGATION INJ NACL 0.9% 500ML BTL

## (undated) DEVICE — 3 BONE CEMENT MIXER: Brand: MIXEVAC

## (undated) DEVICE — Device

## (undated) DEVICE — PIN GUIDE FIX 3.2X62 MM SCREW [GS903A0620322P] [KOMET MEDICAL]

## (undated) DEVICE — NEEDLE SPNL 20GA L3.5IN YEL HUB S STL REG WALL FIT STYL W/

## (undated) DEVICE — BLADE SAW 1.19X20X90 MM FOR LG BNE

## (undated) DEVICE — SUT VCRL + 2-0 36IN CT1 UD --

## (undated) DEVICE — SUTURE STRATAFIX SYMMETRIC PDS + 1 SGL ARMED CT 18 IN LEN SXPP1A405

## (undated) DEVICE — ZIP 16 SURGICAL SKIN CLOSURE DEVICE: Brand: ZIP 16 SURGICAL SKIN CLOSURE DEVICE

## (undated) DEVICE — NDL SPNE QNCKE 18GX3.5IN LF --

## (undated) DEVICE — SOLUTION IV 100ML 0.9% SOD CHL DIL INJ

## (undated) DEVICE — HANDPIECE SET WITH HIGH FLOW TIP AND SUCTION TUBE: Brand: INTERPULSE